# Patient Record
Sex: MALE | Race: WHITE | NOT HISPANIC OR LATINO
[De-identification: names, ages, dates, MRNs, and addresses within clinical notes are randomized per-mention and may not be internally consistent; named-entity substitution may affect disease eponyms.]

---

## 2017-01-04 ENCOUNTER — RECORDS - HEALTHEAST (OUTPATIENT)
Dept: ADMINISTRATIVE | Facility: OTHER | Age: 82
End: 2017-01-04

## 2017-01-05 ENCOUNTER — AMBULATORY - HEALTHEAST (OUTPATIENT)
Dept: LAB | Facility: CLINIC | Age: 82
End: 2017-01-05

## 2017-01-05 ENCOUNTER — COMMUNICATION - HEALTHEAST (OUTPATIENT)
Dept: INTERNAL MEDICINE | Facility: CLINIC | Age: 82
End: 2017-01-05

## 2017-01-05 DIAGNOSIS — Z79.01 LONG TERM (CURRENT) USE OF ANTICOAGULANTS: ICD-10-CM

## 2017-01-05 DIAGNOSIS — Z86.711 HISTORY OF PULMONARY EMBOLISM: ICD-10-CM

## 2017-01-17 ENCOUNTER — COMMUNICATION - HEALTHEAST (OUTPATIENT)
Dept: INTERNAL MEDICINE | Facility: CLINIC | Age: 82
End: 2017-01-17

## 2017-01-17 ENCOUNTER — AMBULATORY - HEALTHEAST (OUTPATIENT)
Dept: LAB | Facility: CLINIC | Age: 82
End: 2017-01-17

## 2017-01-17 DIAGNOSIS — Z79.01 LONG TERM (CURRENT) USE OF ANTICOAGULANTS: ICD-10-CM

## 2017-01-17 DIAGNOSIS — Z86.711 HISTORY OF PULMONARY EMBOLISM: ICD-10-CM

## 2017-01-27 ENCOUNTER — COMMUNICATION - HEALTHEAST (OUTPATIENT)
Dept: INTERNAL MEDICINE | Facility: CLINIC | Age: 82
End: 2017-01-27

## 2017-01-31 ENCOUNTER — COMMUNICATION - HEALTHEAST (OUTPATIENT)
Dept: INTERNAL MEDICINE | Facility: CLINIC | Age: 82
End: 2017-01-31

## 2017-02-02 ENCOUNTER — COMMUNICATION - HEALTHEAST (OUTPATIENT)
Dept: INTERNAL MEDICINE | Facility: CLINIC | Age: 82
End: 2017-02-02

## 2017-02-02 ENCOUNTER — AMBULATORY - HEALTHEAST (OUTPATIENT)
Dept: INTERNAL MEDICINE | Facility: CLINIC | Age: 82
End: 2017-02-02

## 2017-02-02 ENCOUNTER — AMBULATORY - HEALTHEAST (OUTPATIENT)
Dept: LAB | Facility: CLINIC | Age: 82
End: 2017-02-02

## 2017-02-02 DIAGNOSIS — Z79.01 LONG TERM (CURRENT) USE OF ANTICOAGULANTS: ICD-10-CM

## 2017-02-02 DIAGNOSIS — Z86.711 HISTORY OF PULMONARY EMBOLISM: ICD-10-CM

## 2017-02-06 ENCOUNTER — COMMUNICATION - HEALTHEAST (OUTPATIENT)
Dept: INTERNAL MEDICINE | Facility: CLINIC | Age: 82
End: 2017-02-06

## 2017-02-06 DIAGNOSIS — Z79.899 ON POTASSIUM WASTING DIURETIC THERAPY: ICD-10-CM

## 2017-02-22 ENCOUNTER — AMBULATORY - HEALTHEAST (OUTPATIENT)
Dept: LAB | Facility: CLINIC | Age: 82
End: 2017-02-22

## 2017-02-22 ENCOUNTER — COMMUNICATION - HEALTHEAST (OUTPATIENT)
Dept: INTERNAL MEDICINE | Facility: CLINIC | Age: 82
End: 2017-02-22

## 2017-02-22 DIAGNOSIS — Z79.01 LONG TERM (CURRENT) USE OF ANTICOAGULANTS: ICD-10-CM

## 2017-02-22 DIAGNOSIS — Z86.711 HISTORY OF PULMONARY EMBOLISM: ICD-10-CM

## 2017-03-02 ENCOUNTER — COMMUNICATION - HEALTHEAST (OUTPATIENT)
Dept: INTERNAL MEDICINE | Facility: CLINIC | Age: 82
End: 2017-03-02

## 2017-03-11 ENCOUNTER — COMMUNICATION - HEALTHEAST (OUTPATIENT)
Dept: INTERNAL MEDICINE | Facility: CLINIC | Age: 82
End: 2017-03-11

## 2017-03-27 ENCOUNTER — AMBULATORY - HEALTHEAST (OUTPATIENT)
Dept: LAB | Facility: CLINIC | Age: 82
End: 2017-03-27

## 2017-03-27 ENCOUNTER — COMMUNICATION - HEALTHEAST (OUTPATIENT)
Dept: INTERNAL MEDICINE | Facility: CLINIC | Age: 82
End: 2017-03-27

## 2017-03-27 DIAGNOSIS — Z86.711 HISTORY OF PULMONARY EMBOLISM: ICD-10-CM

## 2017-03-27 DIAGNOSIS — Z79.01 LONG TERM (CURRENT) USE OF ANTICOAGULANTS: ICD-10-CM

## 2017-04-10 ENCOUNTER — COMMUNICATION - HEALTHEAST (OUTPATIENT)
Dept: NURSING | Facility: CLINIC | Age: 82
End: 2017-04-10

## 2017-04-10 ENCOUNTER — COMMUNICATION - HEALTHEAST (OUTPATIENT)
Dept: INTERNAL MEDICINE | Facility: CLINIC | Age: 82
End: 2017-04-10

## 2017-04-10 ENCOUNTER — AMBULATORY - HEALTHEAST (OUTPATIENT)
Dept: LAB | Facility: CLINIC | Age: 82
End: 2017-04-10

## 2017-04-10 DIAGNOSIS — Z79.01 LONG TERM (CURRENT) USE OF ANTICOAGULANTS: ICD-10-CM

## 2017-04-10 DIAGNOSIS — Z86.711 HISTORY OF PULMONARY EMBOLISM: ICD-10-CM

## 2017-04-20 ENCOUNTER — COMMUNICATION - HEALTHEAST (OUTPATIENT)
Dept: INTERNAL MEDICINE | Facility: CLINIC | Age: 82
End: 2017-04-20

## 2017-04-20 ENCOUNTER — AMBULATORY - HEALTHEAST (OUTPATIENT)
Dept: LAB | Facility: CLINIC | Age: 82
End: 2017-04-20

## 2017-04-20 DIAGNOSIS — Z79.01 LONG TERM (CURRENT) USE OF ANTICOAGULANTS: ICD-10-CM

## 2017-04-20 DIAGNOSIS — Z86.711 HISTORY OF PULMONARY EMBOLISM: ICD-10-CM

## 2017-05-22 ENCOUNTER — COMMUNICATION - HEALTHEAST (OUTPATIENT)
Dept: INTERNAL MEDICINE | Facility: CLINIC | Age: 82
End: 2017-05-22

## 2017-05-24 ENCOUNTER — COMMUNICATION - HEALTHEAST (OUTPATIENT)
Dept: INTERNAL MEDICINE | Facility: CLINIC | Age: 82
End: 2017-05-24

## 2017-05-24 DIAGNOSIS — K21.9 GERD (GASTROESOPHAGEAL REFLUX DISEASE): ICD-10-CM

## 2017-05-24 DIAGNOSIS — I10 HTN (HYPERTENSION): ICD-10-CM

## 2017-06-05 ENCOUNTER — COMMUNICATION - HEALTHEAST (OUTPATIENT)
Dept: INTERNAL MEDICINE | Facility: CLINIC | Age: 82
End: 2017-06-05

## 2017-06-05 ENCOUNTER — AMBULATORY - HEALTHEAST (OUTPATIENT)
Dept: LAB | Facility: CLINIC | Age: 82
End: 2017-06-05

## 2017-06-05 DIAGNOSIS — Z79.01 LONG TERM (CURRENT) USE OF ANTICOAGULANTS: ICD-10-CM

## 2017-06-05 DIAGNOSIS — Z86.711 HISTORY OF PULMONARY EMBOLISM: ICD-10-CM

## 2017-06-14 ENCOUNTER — COMMUNICATION - HEALTHEAST (OUTPATIENT)
Dept: INTERNAL MEDICINE | Facility: CLINIC | Age: 82
End: 2017-06-14

## 2017-06-14 DIAGNOSIS — Z79.01 LONG TERM (CURRENT) USE OF ANTICOAGULANTS: ICD-10-CM

## 2017-06-22 ENCOUNTER — COMMUNICATION - HEALTHEAST (OUTPATIENT)
Dept: INTERNAL MEDICINE | Facility: CLINIC | Age: 82
End: 2017-06-22

## 2017-06-22 DIAGNOSIS — I10 HYPERTENSION: ICD-10-CM

## 2017-07-18 ENCOUNTER — COMMUNICATION - HEALTHEAST (OUTPATIENT)
Dept: FAMILY MEDICINE | Facility: CLINIC | Age: 82
End: 2017-07-18

## 2017-07-24 ENCOUNTER — COMMUNICATION - HEALTHEAST (OUTPATIENT)
Dept: INTERNAL MEDICINE | Facility: CLINIC | Age: 82
End: 2017-07-24

## 2017-07-24 ENCOUNTER — AMBULATORY - HEALTHEAST (OUTPATIENT)
Dept: LAB | Facility: CLINIC | Age: 82
End: 2017-07-24

## 2017-07-24 DIAGNOSIS — Z86.711 PERSONAL HISTORY OF PE (PULMONARY EMBOLISM): ICD-10-CM

## 2017-07-24 DIAGNOSIS — Z79.01 LONG TERM (CURRENT) USE OF ANTICOAGULANTS: ICD-10-CM

## 2017-07-24 DIAGNOSIS — Z86.711 HISTORY OF PULMONARY EMBOLISM: ICD-10-CM

## 2017-07-24 DIAGNOSIS — Z79.01 WARFARIN ANTICOAGULATION: ICD-10-CM

## 2017-07-25 ENCOUNTER — COMMUNICATION - HEALTHEAST (OUTPATIENT)
Dept: INTERNAL MEDICINE | Facility: CLINIC | Age: 82
End: 2017-07-25

## 2017-08-09 ENCOUNTER — COMMUNICATION - HEALTHEAST (OUTPATIENT)
Dept: INTERNAL MEDICINE | Facility: CLINIC | Age: 82
End: 2017-08-09

## 2017-08-09 DIAGNOSIS — Z79.899 ON POTASSIUM WASTING DIURETIC THERAPY: ICD-10-CM

## 2017-08-29 ENCOUNTER — COMMUNICATION - HEALTHEAST (OUTPATIENT)
Dept: INTERNAL MEDICINE | Facility: CLINIC | Age: 82
End: 2017-08-29

## 2017-09-16 ENCOUNTER — COMMUNICATION - HEALTHEAST (OUTPATIENT)
Dept: INTERNAL MEDICINE | Facility: CLINIC | Age: 82
End: 2017-09-16

## 2017-09-20 ENCOUNTER — COMMUNICATION - HEALTHEAST (OUTPATIENT)
Dept: INTERNAL MEDICINE | Facility: CLINIC | Age: 82
End: 2017-09-20

## 2017-09-20 ENCOUNTER — AMBULATORY - HEALTHEAST (OUTPATIENT)
Dept: LAB | Facility: CLINIC | Age: 82
End: 2017-09-20

## 2017-09-20 DIAGNOSIS — Z79.01 LONG TERM (CURRENT) USE OF ANTICOAGULANTS: ICD-10-CM

## 2017-09-20 DIAGNOSIS — Z79.01 WARFARIN ANTICOAGULATION: ICD-10-CM

## 2017-09-20 DIAGNOSIS — Z86.711 PERSONAL HISTORY OF PE (PULMONARY EMBOLISM): ICD-10-CM

## 2017-10-09 ENCOUNTER — COMMUNICATION - HEALTHEAST (OUTPATIENT)
Dept: INTERNAL MEDICINE | Facility: CLINIC | Age: 82
End: 2017-10-09

## 2017-10-09 DIAGNOSIS — I10 HYPERTENSION: ICD-10-CM

## 2017-10-16 ENCOUNTER — COMMUNICATION - HEALTHEAST (OUTPATIENT)
Dept: INTERNAL MEDICINE | Facility: CLINIC | Age: 82
End: 2017-10-16

## 2017-10-24 ENCOUNTER — AMBULATORY - HEALTHEAST (OUTPATIENT)
Dept: LAB | Facility: CLINIC | Age: 82
End: 2017-10-24

## 2017-10-24 ENCOUNTER — COMMUNICATION - HEALTHEAST (OUTPATIENT)
Dept: INTERNAL MEDICINE | Facility: CLINIC | Age: 82
End: 2017-10-24

## 2017-10-24 DIAGNOSIS — Z79.01 LONG TERM CURRENT USE OF ANTICOAGULANT THERAPY: ICD-10-CM

## 2017-10-24 DIAGNOSIS — Z79.01 WARFARIN ANTICOAGULATION: ICD-10-CM

## 2017-10-24 DIAGNOSIS — Z86.711 PERSONAL HISTORY OF PE (PULMONARY EMBOLISM): ICD-10-CM

## 2017-11-14 ENCOUNTER — COMMUNICATION - HEALTHEAST (OUTPATIENT)
Dept: NURSING | Facility: CLINIC | Age: 82
End: 2017-11-14

## 2017-11-20 ENCOUNTER — COMMUNICATION - HEALTHEAST (OUTPATIENT)
Dept: INTERNAL MEDICINE | Facility: CLINIC | Age: 82
End: 2017-11-20

## 2017-11-21 ENCOUNTER — COMMUNICATION - HEALTHEAST (OUTPATIENT)
Dept: INTERNAL MEDICINE | Facility: CLINIC | Age: 82
End: 2017-11-21

## 2017-11-21 DIAGNOSIS — Z79.899 ON POTASSIUM WASTING DIURETIC THERAPY: ICD-10-CM

## 2017-11-29 ENCOUNTER — AMBULATORY - HEALTHEAST (OUTPATIENT)
Dept: LAB | Facility: CLINIC | Age: 82
End: 2017-11-29

## 2017-11-29 ENCOUNTER — COMMUNICATION - HEALTHEAST (OUTPATIENT)
Dept: INTERNAL MEDICINE | Facility: CLINIC | Age: 82
End: 2017-11-29

## 2017-11-29 DIAGNOSIS — Z79.01 LONG TERM CURRENT USE OF ANTICOAGULANT THERAPY: ICD-10-CM

## 2017-11-29 DIAGNOSIS — Z86.711 PERSONAL HISTORY OF PE (PULMONARY EMBOLISM): ICD-10-CM

## 2017-11-29 DIAGNOSIS — Z79.01 WARFARIN ANTICOAGULATION: ICD-10-CM

## 2018-01-01 ENCOUNTER — COMMUNICATION - HEALTHEAST (OUTPATIENT)
Dept: INTERNAL MEDICINE | Facility: CLINIC | Age: 83
End: 2018-01-01

## 2018-01-01 ENCOUNTER — COMMUNICATION - HEALTHEAST (OUTPATIENT)
Dept: ANTICOAGULATION | Facility: CLINIC | Age: 83
End: 2018-01-01

## 2018-01-01 ENCOUNTER — COMMUNICATION - HEALTHEAST (OUTPATIENT)
Dept: GERIATRICS | Facility: CLINIC | Age: 83
End: 2018-01-01

## 2018-01-01 ENCOUNTER — RECORDS - HEALTHEAST (OUTPATIENT)
Dept: LAB | Facility: CLINIC | Age: 83
End: 2018-01-01

## 2018-01-01 ENCOUNTER — OFFICE VISIT - HEALTHEAST (OUTPATIENT)
Dept: GERIATRICS | Facility: CLINIC | Age: 83
End: 2018-01-01

## 2018-01-01 ENCOUNTER — AMBULATORY - HEALTHEAST (OUTPATIENT)
Dept: LAB | Facility: CLINIC | Age: 83
End: 2018-01-01

## 2018-01-01 ENCOUNTER — COMMUNICATION - HEALTHEAST (OUTPATIENT)
Dept: LAB | Facility: CLINIC | Age: 83
End: 2018-01-01

## 2018-01-01 ENCOUNTER — RECORDS - HEALTHEAST (OUTPATIENT)
Dept: ADMINISTRATIVE | Facility: OTHER | Age: 83
End: 2018-01-01

## 2018-01-01 ENCOUNTER — AMBULATORY - HEALTHEAST (OUTPATIENT)
Dept: GERIATRICS | Facility: CLINIC | Age: 83
End: 2018-01-01

## 2018-01-01 ENCOUNTER — PATIENT OUTREACH (OUTPATIENT)
Dept: CARE COORDINATION | Facility: CLINIC | Age: 83
End: 2018-01-01

## 2018-01-01 ENCOUNTER — COMMUNICATION - HEALTHEAST (OUTPATIENT)
Dept: SCHEDULING | Facility: CLINIC | Age: 83
End: 2018-01-01

## 2018-01-01 ENCOUNTER — OFFICE VISIT - HEALTHEAST (OUTPATIENT)
Dept: INTERNAL MEDICINE | Facility: CLINIC | Age: 83
End: 2018-01-01

## 2018-01-01 DIAGNOSIS — M62.81 MUSCLE WEAKNESS (GENERALIZED): ICD-10-CM

## 2018-01-01 DIAGNOSIS — I10 ESSENTIAL HYPERTENSION: ICD-10-CM

## 2018-01-01 DIAGNOSIS — G89.29 CHRONIC LOW BACK PAIN, UNSPECIFIED BACK PAIN LATERALITY, WITH SCIATICA PRESENCE UNSPECIFIED: ICD-10-CM

## 2018-01-01 DIAGNOSIS — Z79.01 WARFARIN ANTICOAGULATION: ICD-10-CM

## 2018-01-01 DIAGNOSIS — Z86.711 PERSONAL HISTORY OF PE (PULMONARY EMBOLISM): ICD-10-CM

## 2018-01-01 DIAGNOSIS — N17.9 AKI (ACUTE KIDNEY INJURY) (H): ICD-10-CM

## 2018-01-01 DIAGNOSIS — Z79.01 LONG TERM (CURRENT) USE OF ANTICOAGULANTS: ICD-10-CM

## 2018-01-01 DIAGNOSIS — Z86.711 HISTORY OF PULMONARY EMBOLUS (PE): ICD-10-CM

## 2018-01-01 DIAGNOSIS — L40.9 PSORIASIS: ICD-10-CM

## 2018-01-01 DIAGNOSIS — I25.10 ATHEROSCLEROSIS OF CORONARY ARTERY, ANGINA PRESENCE UNSPECIFIED, UNSPECIFIED VESSEL OR LESION TYPE, UNSPECIFIED WHETHER NATIVE OR TRANSPLANTED HEART: ICD-10-CM
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
## 2018-01-01 DIAGNOSIS — R60.0 BILATERAL LOWER EXTREMITY EDEMA: ICD-10-CM

## 2018-01-01 DIAGNOSIS — K59.00 CONSTIPATION: ICD-10-CM

## 2018-01-01 DIAGNOSIS — R53.83 FATIGUE, UNSPECIFIED TYPE: ICD-10-CM

## 2018-01-01 DIAGNOSIS — R39.9 URINARY TRACT INFECTION SYMPTOMS: ICD-10-CM

## 2018-01-01 DIAGNOSIS — L97.929 VENOUS STASIS ULCER OF LEFT LOWER EXTREMITY (H): ICD-10-CM

## 2018-01-01 DIAGNOSIS — M17.11 PRIMARY OSTEOARTHRITIS OF RIGHT KNEE: ICD-10-CM

## 2018-01-01 DIAGNOSIS — Z79.899 ENCOUNTER FOR LONG-TERM (CURRENT) USE OF MEDICATIONS: ICD-10-CM

## 2018-01-01 DIAGNOSIS — E87.6 HYPOKALEMIA: ICD-10-CM

## 2018-01-01 DIAGNOSIS — D64.9 ANEMIA: ICD-10-CM

## 2018-01-01 DIAGNOSIS — D35.00 ADRENAL ADENOMA, UNSPECIFIED LATERALITY: ICD-10-CM

## 2018-01-01 DIAGNOSIS — R26.2 CANNOT WALK: ICD-10-CM

## 2018-01-01 DIAGNOSIS — I83.029 VENOUS STASIS ULCER OF LEFT LOWER EXTREMITY (H): ICD-10-CM

## 2018-01-01 DIAGNOSIS — L97.829 NON-PRESSURE CHRONIC ULCER OF OTHER PART OF LEFT LOWER LEG WITH UNSPECIFIED SEVERITY (H): ICD-10-CM

## 2018-01-01 DIAGNOSIS — G89.4 CHRONIC PAIN SYNDROME: ICD-10-CM

## 2018-01-01 DIAGNOSIS — R41.3 MEMORY LOSS: ICD-10-CM
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
## 2018-01-01 DIAGNOSIS — L98.9 SKIN LESIONS: ICD-10-CM

## 2018-01-01 DIAGNOSIS — I89.0 LYMPHEDEMA OF RIGHT LOWER EXTREMITY: ICD-10-CM

## 2018-01-01 DIAGNOSIS — M54.5 CHRONIC LOW BACK PAIN, UNSPECIFIED BACK PAIN LATERALITY, WITH SCIATICA PRESENCE UNSPECIFIED: ICD-10-CM

## 2018-01-01 DIAGNOSIS — S06.0X9A CONCUSSION WITH LOSS OF CONSCIOUSNESS, INITIAL ENCOUNTER: ICD-10-CM

## 2018-01-01 DIAGNOSIS — G93.40 ENCEPHALOPATHY: ICD-10-CM

## 2018-01-01 DIAGNOSIS — E27.9 ADRENAL NODULE (H): ICD-10-CM

## 2018-01-01 LAB
ALBUMIN SERPL-MCNC: 3.7 G/DL (ref 3.5–5)
ALBUMIN SERPL-MCNC: 3.7 G/DL (ref 3.5–5)
ALBUMIN UR-MCNC: ABNORMAL MG/DL
ALP SERPL-CCNC: 35 U/L (ref 45–120)
ALP SERPL-CCNC: 54 U/L (ref 45–120)
ALT SERPL W P-5'-P-CCNC: 11 U/L (ref 0–45)
ALT SERPL W P-5'-P-CCNC: 21 U/L (ref 0–45)
ANION GAP SERPL CALCULATED.3IONS-SCNC: 12 MMOL/L (ref 5–18)
ANION GAP SERPL CALCULATED.3IONS-SCNC: 12 MMOL/L (ref 5–18)
ANION GAP SERPL CALCULATED.3IONS-SCNC: 9 MMOL/L (ref 5–18)
ANION GAP SERPL CALCULATED.3IONS-SCNC: 9 MMOL/L (ref 5–18)
APPEARANCE UR: ABNORMAL
AST SERPL W P-5'-P-CCNC: 11 U/L (ref 0–40)
AST SERPL W P-5'-P-CCNC: 17 U/L (ref 0–40)
BACTERIA #/AREA URNS HPF: ABNORMAL HPF
BACTERIA SPEC CULT: ABNORMAL
BILIRUB SERPL-MCNC: 0.6 MG/DL (ref 0–1)
BILIRUB SERPL-MCNC: 0.7 MG/DL (ref 0–1)
BILIRUB UR QL STRIP: NEGATIVE
BUN SERPL-MCNC: 20 MG/DL (ref 8–28)
BUN SERPL-MCNC: 20 MG/DL (ref 8–28)
BUN SERPL-MCNC: 24 MG/DL (ref 8–28)
BUN SERPL-MCNC: 29 MG/DL (ref 8–28)
CALCIUM SERPL-MCNC: 10.1 MG/DL (ref 8.5–10.5)
CALCIUM SERPL-MCNC: 9.4 MG/DL (ref 8.5–10.5)
CALCIUM SERPL-MCNC: 9.5 MG/DL (ref 8.5–10.5)
CALCIUM SERPL-MCNC: 9.6 MG/DL (ref 8.5–10.5)
CHLORIDE BLD-SCNC: 100 MMOL/L (ref 98–107)
CHLORIDE BLD-SCNC: 102 MMOL/L (ref 98–107)
CHLORIDE BLD-SCNC: 105 MMOL/L (ref 98–107)
CHLORIDE BLD-SCNC: 97 MMOL/L (ref 98–107)
CO2 SERPL-SCNC: 25 MMOL/L (ref 22–31)
CO2 SERPL-SCNC: 25 MMOL/L (ref 22–31)
CO2 SERPL-SCNC: 28 MMOL/L (ref 22–31)
CO2 SERPL-SCNC: 28 MMOL/L (ref 22–31)
COLOR UR AUTO: YELLOW
CREAT SERPL-MCNC: 0.8 MG/DL (ref 0.7–1.3)
CREAT SERPL-MCNC: 0.89 MG/DL (ref 0.7–1.3)
CREAT SERPL-MCNC: 1.18 MG/DL (ref 0.7–1.3)
CREAT SERPL-MCNC: 1.6 MG/DL (ref 0.7–1.3)
ERYTHROCYTE [DISTWIDTH] IN BLOOD BY AUTOMATED COUNT: 12.2 % (ref 11–14.5)
ERYTHROCYTE [DISTWIDTH] IN BLOOD BY AUTOMATED COUNT: 13.2 % (ref 11–14.5)
ERYTHROCYTE [DISTWIDTH] IN BLOOD BY AUTOMATED COUNT: 14.6 % (ref 11–14.5)
GFR SERPL CREATININE-BSD FRML MDRD: 41 ML/MIN/1.73M2
GFR SERPL CREATININE-BSD FRML MDRD: 59 ML/MIN/1.73M2
GFR SERPL CREATININE-BSD FRML MDRD: >60 ML/MIN/1.73M2
GFR SERPL CREATININE-BSD FRML MDRD: >60 ML/MIN/1.73M2
GLUCOSE BLD-MCNC: 102 MG/DL (ref 70–125)
GLUCOSE BLD-MCNC: 87 MG/DL (ref 70–125)
GLUCOSE BLD-MCNC: 91 MG/DL (ref 70–125)
GLUCOSE BLD-MCNC: 91 MG/DL (ref 70–125)
GLUCOSE UR STRIP-MCNC: NEGATIVE MG/DL
HCT VFR BLD AUTO: 31.8 % (ref 40–54)
HCT VFR BLD AUTO: 36.2 % (ref 40–54)
HCT VFR BLD AUTO: 37.3 % (ref 40–54)
HGB BLD-MCNC: 10.4 G/DL (ref 14–18)
HGB BLD-MCNC: 12.6 G/DL (ref 14–18)
HGB BLD-MCNC: 12.9 G/DL (ref 14–18)
HGB UR QL STRIP: ABNORMAL
INR PPP: 1.45 (ref 0.9–1.1)
INR PPP: 1.7 (ref 0.9–1.1)
INR PPP: 2.32 (ref 0.9–1.1)
INR PPP: 2.33 (ref 0.9–1.1)
INR PPP: 2.39 (ref 0.9–1.1)
INR PPP: 2.62 (ref 0.9–1.1)
INR PPP: 2.7 (ref 0.9–1.1)
INR PPP: 3.2 (ref 0.9–1.1)
INR PPP: 3.61 (ref 0.9–1.1)
INR PPP: 4.3 (ref 0.9–1.1)
INR PPP: 4.8 (ref 0.9–1.1)
INR PPP: 4.94 (ref 0.9–1.1)
INR PPP: 5.2 (ref 0.9–1.1)
KETONES UR STRIP-MCNC: NEGATIVE MG/DL
LEUKOCYTE ESTERASE UR QL STRIP: ABNORMAL
MCH RBC QN AUTO: 33.8 PG (ref 27–34)
MCH RBC QN AUTO: 35.1 PG (ref 27–34)
MCH RBC QN AUTO: 36.1 PG (ref 27–34)
MCHC RBC AUTO-ENTMCNC: 32.7 G/DL (ref 32–36)
MCHC RBC AUTO-ENTMCNC: 34.5 G/DL (ref 32–36)
MCHC RBC AUTO-ENTMCNC: 34.7 G/DL (ref 32–36)
MCV RBC AUTO: 101 FL (ref 80–100)
MCV RBC AUTO: 110 FL (ref 80–100)
MCV RBC AUTO: 97 FL (ref 80–100)
NITRATE UR QL: POSITIVE
PH UR STRIP: 8 [PH] (ref 4.5–8)
PLATELET # BLD AUTO: 249 THOU/UL (ref 140–440)
PLATELET # BLD AUTO: 249 THOU/UL (ref 140–440)
PLATELET # BLD AUTO: 293 THOU/UL (ref 140–440)
PMV BLD AUTO: 6.4 FL (ref 7–10)
PMV BLD AUTO: 6.5 FL (ref 7–10)
PMV BLD AUTO: 9.6 FL (ref 8.5–12.5)
POTASSIUM BLD-SCNC: 3.8 MMOL/L (ref 3.5–5)
POTASSIUM BLD-SCNC: 4.1 MMOL/L (ref 3.5–5)
POTASSIUM BLD-SCNC: 4.1 MMOL/L (ref 3.5–5)
POTASSIUM BLD-SCNC: 4.3 MMOL/L (ref 3.5–5)
PROT SERPL-MCNC: 6.3 G/DL (ref 6–8)
PROT SERPL-MCNC: 6.4 G/DL (ref 6–8)
RBC # BLD AUTO: 2.88 MILL/UL (ref 4.4–6.2)
RBC # BLD AUTO: 3.68 MILL/UL (ref 4.4–6.2)
RBC # BLD AUTO: 3.72 MILL/UL (ref 4.4–6.2)
RBC #/AREA URNS AUTO: ABNORMAL HPF
SODIUM SERPL-SCNC: 136 MMOL/L (ref 136–145)
SODIUM SERPL-SCNC: 137 MMOL/L (ref 136–145)
SODIUM SERPL-SCNC: 139 MMOL/L (ref 136–145)
SODIUM SERPL-SCNC: 140 MMOL/L (ref 136–145)
SP GR UR STRIP: 1.02 (ref 1–1.03)
SQUAMOUS #/AREA URNS AUTO: ABNORMAL LPF
UROBILINOGEN UR STRIP-ACNC: ABNORMAL
WBC #/AREA URNS AUTO: >100 HPF
WBC: 8.8 THOU/UL (ref 4–11)
WBC: 9.8 THOU/UL (ref 4–11)
WBC: 9.9 THOU/UL (ref 4–11)

## 2018-01-05 ENCOUNTER — COMMUNICATION - HEALTHEAST (OUTPATIENT)
Dept: INTERNAL MEDICINE | Facility: CLINIC | Age: 83
End: 2018-01-05

## 2018-02-15 ENCOUNTER — OFFICE VISIT - HEALTHEAST (OUTPATIENT)
Dept: INTERNAL MEDICINE | Facility: CLINIC | Age: 83
End: 2018-02-15

## 2018-02-15 ENCOUNTER — COMMUNICATION - HEALTHEAST (OUTPATIENT)
Dept: INTERNAL MEDICINE | Facility: CLINIC | Age: 83
End: 2018-02-15

## 2018-02-15 DIAGNOSIS — M54.5 CHRONIC MIDLINE LOW BACK PAIN, WITH SCIATICA PRESENCE UNSPECIFIED: ICD-10-CM

## 2018-02-15 DIAGNOSIS — Z79.01 WARFARIN ANTICOAGULATION: ICD-10-CM

## 2018-02-15 DIAGNOSIS — Z79.01 LONG TERM CURRENT USE OF ANTICOAGULANT THERAPY: ICD-10-CM

## 2018-02-15 DIAGNOSIS — G89.29 CHRONIC MIDLINE LOW BACK PAIN, WITH SCIATICA PRESENCE UNSPECIFIED: ICD-10-CM

## 2018-02-15 DIAGNOSIS — M79.10 MYALGIA: ICD-10-CM

## 2018-02-15 DIAGNOSIS — I89.0 LYMPHEDEMA OF RIGHT LOWER EXTREMITY: ICD-10-CM

## 2018-02-15 DIAGNOSIS — I10 ESSENTIAL HYPERTENSION WITH GOAL BLOOD PRESSURE LESS THAN 140/90: ICD-10-CM

## 2018-02-15 DIAGNOSIS — I10 ESSENTIAL HYPERTENSION: ICD-10-CM

## 2018-02-15 DIAGNOSIS — Z86.711 PERSONAL HISTORY OF PE (PULMONARY EMBOLISM): ICD-10-CM

## 2018-02-15 DIAGNOSIS — R60.1 GENERALIZED EDEMA: ICD-10-CM

## 2018-02-15 LAB
ALBUMIN SERPL-MCNC: 3.5 G/DL (ref 3.5–5)
ALP SERPL-CCNC: 48 U/L (ref 45–120)
ALT SERPL W P-5'-P-CCNC: 12 U/L (ref 0–45)
ANION GAP SERPL CALCULATED.3IONS-SCNC: 10 MMOL/L (ref 5–18)
AST SERPL W P-5'-P-CCNC: 14 U/L (ref 0–40)
BILIRUB SERPL-MCNC: 0.6 MG/DL (ref 0–1)
BUN SERPL-MCNC: 10 MG/DL (ref 8–28)
CALCIUM SERPL-MCNC: 9.1 MG/DL (ref 8.5–10.5)
CHLORIDE BLD-SCNC: 107 MMOL/L (ref 98–107)
CO2 SERPL-SCNC: 24 MMOL/L (ref 22–31)
CREAT SERPL-MCNC: 0.76 MG/DL (ref 0.7–1.3)
ERYTHROCYTE [DISTWIDTH] IN BLOOD BY AUTOMATED COUNT: 12 % (ref 11–14.5)
ERYTHROCYTE [SEDIMENTATION RATE] IN BLOOD BY WESTERGREN METHOD: 11 MM/HR (ref 0–15)
GFR SERPL CREATININE-BSD FRML MDRD: >60 ML/MIN/1.73M2
GLUCOSE BLD-MCNC: 93 MG/DL (ref 70–125)
HCT VFR BLD AUTO: 40.7 % (ref 40–54)
HGB BLD-MCNC: 13.7 G/DL (ref 14–18)
INR PPP: 1.8 (ref 0.9–1.1)
MCH RBC QN AUTO: 32.3 PG (ref 27–34)
MCHC RBC AUTO-ENTMCNC: 33.7 G/DL (ref 32–36)
MCV RBC AUTO: 96 FL (ref 80–100)
PLATELET # BLD AUTO: 201 THOU/UL (ref 140–440)
PMV BLD AUTO: 7.7 FL (ref 7–10)
POTASSIUM BLD-SCNC: 4.3 MMOL/L (ref 3.5–5)
PROT SERPL-MCNC: 6.2 G/DL (ref 6–8)
RBC # BLD AUTO: 4.24 MILL/UL (ref 4.4–6.2)
SODIUM SERPL-SCNC: 141 MMOL/L (ref 136–145)
TSH SERPL DL<=0.005 MIU/L-ACNC: 0.79 UIU/ML (ref 0.3–5)
URATE SERPL-MCNC: 5.7 MG/DL (ref 3–8)
VIT B12 SERPL-MCNC: 232 PG/ML (ref 213–816)
WBC: 7.2 THOU/UL (ref 4–11)

## 2018-02-16 ENCOUNTER — COMMUNICATION - HEALTHEAST (OUTPATIENT)
Dept: INTERNAL MEDICINE | Facility: CLINIC | Age: 83
End: 2018-02-16

## 2018-02-16 DIAGNOSIS — E55.9 VITAMIN D DEFICIENCY: ICD-10-CM

## 2018-02-16 DIAGNOSIS — D51.0 PERNICIOUS ANEMIA: ICD-10-CM

## 2018-02-16 LAB — 25(OH)D3 SERPL-MCNC: 8.8 NG/ML (ref 30–80)

## 2018-02-21 ENCOUNTER — AMBULATORY - HEALTHEAST (OUTPATIENT)
Dept: NURSING | Facility: CLINIC | Age: 83
End: 2018-02-21

## 2018-02-21 ENCOUNTER — COMMUNICATION - HEALTHEAST (OUTPATIENT)
Dept: INTERNAL MEDICINE | Facility: CLINIC | Age: 83
End: 2018-02-21

## 2018-02-21 DIAGNOSIS — Z79.899 ON POTASSIUM WASTING DIURETIC THERAPY: ICD-10-CM

## 2018-02-26 ENCOUNTER — COMMUNICATION - HEALTHEAST (OUTPATIENT)
Dept: INTERNAL MEDICINE | Facility: CLINIC | Age: 83
End: 2018-02-26

## 2018-02-26 DIAGNOSIS — I10 ESSENTIAL HYPERTENSION: ICD-10-CM

## 2018-03-01 ENCOUNTER — OFFICE VISIT - HEALTHEAST (OUTPATIENT)
Dept: INTERNAL MEDICINE | Facility: CLINIC | Age: 83
End: 2018-03-01

## 2018-03-01 DIAGNOSIS — R60.1 GENERALIZED EDEMA: ICD-10-CM

## 2018-03-01 DIAGNOSIS — M54.5 CHRONIC MIDLINE LOW BACK PAIN, WITH SCIATICA PRESENCE UNSPECIFIED: ICD-10-CM

## 2018-03-01 DIAGNOSIS — I10 ESSENTIAL HYPERTENSION: ICD-10-CM

## 2018-03-01 DIAGNOSIS — M25.561 ACUTE PAIN OF RIGHT KNEE: ICD-10-CM

## 2018-03-01 DIAGNOSIS — G89.29 CHRONIC MIDLINE LOW BACK PAIN, WITH SCIATICA PRESENCE UNSPECIFIED: ICD-10-CM

## 2018-03-13 ENCOUNTER — COMMUNICATION - HEALTHEAST (OUTPATIENT)
Dept: INTERNAL MEDICINE | Facility: CLINIC | Age: 83
End: 2018-03-13

## 2018-03-22 ENCOUNTER — OFFICE VISIT - HEALTHEAST (OUTPATIENT)
Dept: INTERNAL MEDICINE | Facility: CLINIC | Age: 83
End: 2018-03-22

## 2018-03-22 ENCOUNTER — COMMUNICATION - HEALTHEAST (OUTPATIENT)
Dept: INTERNAL MEDICINE | Facility: CLINIC | Age: 83
End: 2018-03-22

## 2018-03-22 DIAGNOSIS — Z79.01 LONG TERM CURRENT USE OF ANTICOAGULANT THERAPY: ICD-10-CM

## 2018-03-22 DIAGNOSIS — Z86.711 PERSONAL HISTORY OF PE (PULMONARY EMBOLISM): ICD-10-CM

## 2018-03-22 DIAGNOSIS — G89.29 CHRONIC MIDLINE LOW BACK PAIN, WITH SCIATICA PRESENCE UNSPECIFIED: ICD-10-CM

## 2018-03-22 DIAGNOSIS — M54.5 CHRONIC MIDLINE LOW BACK PAIN, WITH SCIATICA PRESENCE UNSPECIFIED: ICD-10-CM

## 2018-03-22 DIAGNOSIS — Z79.01 WARFARIN ANTICOAGULATION: ICD-10-CM

## 2018-03-22 DIAGNOSIS — R60.1 GENERALIZED EDEMA: ICD-10-CM

## 2018-03-22 DIAGNOSIS — I10 ESSENTIAL HYPERTENSION: ICD-10-CM

## 2018-03-22 LAB — INR PPP: 1.5 (ref 0.9–1.1)

## 2018-04-05 ENCOUNTER — COMMUNICATION - HEALTHEAST (OUTPATIENT)
Dept: ANTICOAGULATION | Facility: CLINIC | Age: 83
End: 2018-04-05

## 2018-04-05 ENCOUNTER — OFFICE VISIT - HEALTHEAST (OUTPATIENT)
Dept: INTERNAL MEDICINE | Facility: CLINIC | Age: 83
End: 2018-04-05

## 2018-04-05 DIAGNOSIS — I10 ESSENTIAL HYPERTENSION: ICD-10-CM

## 2018-04-05 DIAGNOSIS — Z79.01 LONG TERM CURRENT USE OF ANTICOAGULANT THERAPY: ICD-10-CM

## 2018-04-05 DIAGNOSIS — I89.0 LYMPHEDEMA OF RIGHT LOWER EXTREMITY: ICD-10-CM

## 2018-04-05 DIAGNOSIS — M54.5 CHRONIC MIDLINE LOW BACK PAIN, WITH SCIATICA PRESENCE UNSPECIFIED: ICD-10-CM

## 2018-04-05 DIAGNOSIS — Z86.711 PERSONAL HISTORY OF PE (PULMONARY EMBOLISM): ICD-10-CM

## 2018-04-05 DIAGNOSIS — Z79.01 WARFARIN ANTICOAGULATION: ICD-10-CM

## 2018-04-05 DIAGNOSIS — R60.1 GENERALIZED EDEMA: ICD-10-CM

## 2018-04-05 DIAGNOSIS — G89.29 CHRONIC MIDLINE LOW BACK PAIN, WITH SCIATICA PRESENCE UNSPECIFIED: ICD-10-CM

## 2018-04-05 LAB — INR PPP: 2.1 (ref 0.9–1.1)

## 2018-05-03 ENCOUNTER — COMMUNICATION - HEALTHEAST (OUTPATIENT)
Dept: ANTICOAGULATION | Facility: CLINIC | Age: 83
End: 2018-05-03

## 2018-05-03 ENCOUNTER — OFFICE VISIT - HEALTHEAST (OUTPATIENT)
Dept: INTERNAL MEDICINE | Facility: CLINIC | Age: 83
End: 2018-05-03

## 2018-05-03 DIAGNOSIS — Z79.01 LONG TERM CURRENT USE OF ANTICOAGULANT THERAPY: ICD-10-CM

## 2018-05-03 DIAGNOSIS — Z86.711 PERSONAL HISTORY OF PE (PULMONARY EMBOLISM): ICD-10-CM

## 2018-05-03 DIAGNOSIS — I10 ESSENTIAL HYPERTENSION: ICD-10-CM

## 2018-05-03 DIAGNOSIS — D51.0 PERNICIOUS ANEMIA: ICD-10-CM

## 2018-05-03 DIAGNOSIS — E55.9 VITAMIN D DEFICIENCY: ICD-10-CM

## 2018-05-03 DIAGNOSIS — Z79.01 WARFARIN ANTICOAGULATION: ICD-10-CM

## 2018-05-03 DIAGNOSIS — I89.0 LYMPHEDEMA OF RIGHT LOWER EXTREMITY: ICD-10-CM

## 2018-05-03 LAB
ANION GAP SERPL CALCULATED.3IONS-SCNC: 14 MMOL/L (ref 5–18)
BUN SERPL-MCNC: 41 MG/DL (ref 8–28)
CALCIUM SERPL-MCNC: 9.8 MG/DL (ref 8.5–10.5)
CHLORIDE BLD-SCNC: 99 MMOL/L (ref 98–107)
CO2 SERPL-SCNC: 28 MMOL/L (ref 22–31)
CREAT SERPL-MCNC: 1.19 MG/DL (ref 0.7–1.3)
ERYTHROCYTE [DISTWIDTH] IN BLOOD BY AUTOMATED COUNT: 11.5 % (ref 11–14.5)
GFR SERPL CREATININE-BSD FRML MDRD: 58 ML/MIN/1.73M2
GLUCOSE BLD-MCNC: 93 MG/DL (ref 70–125)
HCT VFR BLD AUTO: 44.5 % (ref 40–54)
HGB BLD-MCNC: 15.3 G/DL (ref 14–18)
INR PPP: 3 (ref 0.9–1.1)
MCH RBC QN AUTO: 32.8 PG (ref 27–34)
MCHC RBC AUTO-ENTMCNC: 34.4 G/DL (ref 32–36)
MCV RBC AUTO: 95 FL (ref 80–100)
PLATELET # BLD AUTO: 221 THOU/UL (ref 140–440)
PMV BLD AUTO: 7.2 FL (ref 7–10)
POTASSIUM BLD-SCNC: 4.5 MMOL/L (ref 3.5–5)
RBC # BLD AUTO: 4.66 MILL/UL (ref 4.4–6.2)
SODIUM SERPL-SCNC: 141 MMOL/L (ref 136–145)
WBC: 8.6 THOU/UL (ref 4–11)

## 2018-05-04 ENCOUNTER — COMMUNICATION - HEALTHEAST (OUTPATIENT)
Dept: INTERNAL MEDICINE | Facility: CLINIC | Age: 83
End: 2018-05-04

## 2018-05-04 LAB — 25(OH)D3 SERPL-MCNC: 45.8 NG/ML (ref 30–80)

## 2018-05-07 ENCOUNTER — COMMUNICATION - HEALTHEAST (OUTPATIENT)
Dept: INTERNAL MEDICINE | Facility: CLINIC | Age: 83
End: 2018-05-07

## 2018-05-17 ENCOUNTER — COMMUNICATION - HEALTHEAST (OUTPATIENT)
Dept: INTERNAL MEDICINE | Facility: CLINIC | Age: 83
End: 2018-05-17

## 2018-05-24 ENCOUNTER — COMMUNICATION - HEALTHEAST (OUTPATIENT)
Dept: INTERNAL MEDICINE | Facility: CLINIC | Age: 83
End: 2018-05-24

## 2018-05-25 ENCOUNTER — COMMUNICATION - HEALTHEAST (OUTPATIENT)
Dept: ANTICOAGULATION | Facility: CLINIC | Age: 83
End: 2018-05-25

## 2018-06-12 ENCOUNTER — COMMUNICATION - HEALTHEAST (OUTPATIENT)
Dept: INTERNAL MEDICINE | Facility: CLINIC | Age: 83
End: 2018-06-12

## 2018-06-19 ENCOUNTER — COMMUNICATION - HEALTHEAST (OUTPATIENT)
Dept: ANTICOAGULATION | Facility: CLINIC | Age: 83
End: 2018-06-19

## 2018-06-19 ENCOUNTER — OFFICE VISIT - HEALTHEAST (OUTPATIENT)
Dept: INTERNAL MEDICINE | Facility: CLINIC | Age: 83
End: 2018-06-19

## 2018-06-19 DIAGNOSIS — Z86.711 PERSONAL HISTORY OF PE (PULMONARY EMBOLISM): ICD-10-CM

## 2018-06-19 DIAGNOSIS — M17.11 PRIMARY OSTEOARTHRITIS OF RIGHT KNEE: ICD-10-CM

## 2018-06-19 DIAGNOSIS — R53.83 FATIGUE, UNSPECIFIED TYPE: ICD-10-CM

## 2018-06-19 DIAGNOSIS — H61.23 CERUMEN DEBRIS ON TYMPANIC MEMBRANE OF BOTH EARS: ICD-10-CM

## 2018-06-19 DIAGNOSIS — I89.0 LYMPHEDEMA OF RIGHT LOWER EXTREMITY: ICD-10-CM

## 2018-06-19 DIAGNOSIS — I10 ESSENTIAL HYPERTENSION: ICD-10-CM

## 2018-06-19 DIAGNOSIS — Z79.01 LONG TERM CURRENT USE OF ANTICOAGULANT THERAPY: ICD-10-CM

## 2018-06-19 DIAGNOSIS — Z79.01 WARFARIN ANTICOAGULATION: ICD-10-CM

## 2018-06-19 DIAGNOSIS — H11.31 SUBCONJUNCTIVAL HEMORRHAGE OF RIGHT EYE: ICD-10-CM

## 2018-06-19 LAB — INR PPP: 1.5 (ref 0.9–1.1)

## 2018-06-27 ENCOUNTER — RECORDS - HEALTHEAST (OUTPATIENT)
Dept: ADMINISTRATIVE | Facility: OTHER | Age: 83
End: 2018-06-27

## 2018-06-27 ENCOUNTER — COMMUNICATION - HEALTHEAST (OUTPATIENT)
Dept: INTERNAL MEDICINE | Facility: CLINIC | Age: 83
End: 2018-06-27

## 2018-06-27 ENCOUNTER — OFFICE VISIT - HEALTHEAST (OUTPATIENT)
Dept: INTERNAL MEDICINE | Facility: CLINIC | Age: 83
End: 2018-06-27

## 2018-06-27 DIAGNOSIS — L40.9 PSORIASIS: ICD-10-CM

## 2018-06-27 DIAGNOSIS — H61.21 CERUMEN DEBRIS ON TYMPANIC MEMBRANE OF RIGHT EAR: ICD-10-CM

## 2018-06-28 ENCOUNTER — COMMUNICATION - HEALTHEAST (OUTPATIENT)
Dept: ANTICOAGULATION | Facility: CLINIC | Age: 83
End: 2018-06-28

## 2018-06-28 DIAGNOSIS — Z79.01 WARFARIN ANTICOAGULATION: ICD-10-CM

## 2018-06-28 DIAGNOSIS — Z86.711 PERSONAL HISTORY OF PE (PULMONARY EMBOLISM): ICD-10-CM

## 2018-07-03 ENCOUNTER — COMMUNICATION - HEALTHEAST (OUTPATIENT)
Dept: ANTICOAGULATION | Facility: CLINIC | Age: 83
End: 2018-07-03

## 2018-07-03 ENCOUNTER — COMMUNICATION - HEALTHEAST (OUTPATIENT)
Dept: INTERNAL MEDICINE | Facility: CLINIC | Age: 83
End: 2018-07-03

## 2018-07-17 ENCOUNTER — COMMUNICATION - HEALTHEAST (OUTPATIENT)
Dept: ANTICOAGULATION | Facility: CLINIC | Age: 83
End: 2018-07-17

## 2018-07-17 ENCOUNTER — AMBULATORY - HEALTHEAST (OUTPATIENT)
Dept: LAB | Facility: CLINIC | Age: 83
End: 2018-07-17

## 2018-07-17 DIAGNOSIS — Z79.01 WARFARIN ANTICOAGULATION: ICD-10-CM

## 2018-07-17 DIAGNOSIS — Z86.711 PERSONAL HISTORY OF PE (PULMONARY EMBOLISM): ICD-10-CM

## 2018-07-17 LAB — INR PPP: 3.4 (ref 0.9–1.1)

## 2018-07-18 ENCOUNTER — COMMUNICATION - HEALTHEAST (OUTPATIENT)
Dept: INTERNAL MEDICINE | Facility: CLINIC | Age: 83
End: 2018-07-18

## 2018-07-18 DIAGNOSIS — R53.83 FATIGUE, UNSPECIFIED TYPE: ICD-10-CM

## 2018-12-26 NOTE — PROGRESS NOTES
Clinic Care Coordination Contact    Situation: Patient chart reviewed by care coordinator.    Background: Routed chart for post TCU call. From chart review:  [facility]     NAME:  Axel Gonzales             :  1932  MRN: [id number]  CODE STATUS:  DNR     VISIT TYPE: DISCHARGE SUMMARY  FACILYTY: [facility] [[id number]]                     PRIMARY CARE PROVIDER: Danis Kenny MD     DISCHARGE DIAGNOSIS:       1. Fall, subsequent encounter    2. Bilateral lower extremity edema    3. Muscle weakness (generalized)    4. Atherosclerosis of coronary artery, angina presence unspecified, unspecified vessel or lesion type, unspecified whether native or transplanted heart    5. Essential hypertension    6. Warfarin anticoagulation    7. Chronic low back pain, unspecified back pain laterality, with sciatica presence unspecified          HISTORY OF PRESENT ILLNESS: Axel Gonzales is a 85 y.o. male  with hearing loss, h/o PE on coumadin,  History of renal artery stenosis, Bilateral adrenal adenomas, MEGHANA not on CPAP, chronic lethargy on adderall xr, psoriasis presented to ED due to a fall.       Hospital Course:   -Fall in bathroom: suspect mechanical. Head contusion on coumadin with CT head x2 negative for ICH.  CT C/T/L spine no acute fractures.  See official report for details. Telemetry no dysrhythmias.   -Acute encephalopathy, unspecified with probable chronic cognitive impairment/memory loss: CT head neg x2.  VBG no hypercarbia or hypoxia.  TSH 0.38, B12 1464. UA neg for infection.  DEMOND on admission and received 4mg IM Morphine so suspect some of the confusion was related to this and delayed clearance of opiate.   However, per spouse had confusion a few days preceding admission which was not his baseline.  EtOH <10.  Initial leukocytosis 15K and normalized quickly.  CXR single view suggested Nodular opacity right perihilar region. Repeat 2V CXR negative.  Procalcitonin 0.06.   Afebrile.  BC's x2 negative.  Per Neurology will not perform additional w/u such as MRI at this time to exclude CVA. Although I'm concerned with his slight dysarthria and some word finding difficulties acute stroke is possible.  But as I explained to family he's already on Coumadin, and was started on Zocor 20mg qhs due to his LDL of 199, but otherwise management would not be different otherwise.  Also, he cannot lie down flat, and has slept in a recliner for years, and they agreed he would not tolerate an MRI.  Appears near baseline at discharge.  PT/OT at TCU.   -Possible aspiration pneumonitis: speech therapy consulted and bedside swallow evaluation negative for signs/symptoms of aspiration.  Repeat CXR with PA/Lateral negative for any acute infiltrate.   BC's negative.  No hypoxia.  Normal procalcitonin.  No antibiotics continued at discharge.  -H/O PE:  continue warfarin.  Subtherapeutic INR at discharge so dose increased to 3mg daily.  Normally on 2.5mg daily.  Repeat INR on 11/26 with TCU physician to adjust dose accordingly.  -DEMOND: resolved  -HTN:Continue Lisinopril as prescribed and adjust dose accordingly. Hold HCTZ.  Monitor closely at TCU.  -Chronic lethargy: resumed adderall xr after d/w neurology as patient has been on it chronically for fatigue and lethargy per his PCP.  However, decreased dose to 10mg daily.  Ultimately, I would recommend discontinuation of this agent.  -MEGHANA: failed cpap in the past. No hypercarbia on admission. Untreated MEGHANA likely contributor to his chronic Fatigue  -Psoriasis: resumed MTX at discharge. Cont Folic acid  -Chronic BLE edema: on warfarin chronic with therapeutic INR on admission so doubt acute DVT.  BNP 52 so unlikely acute decompensated heart failure.  Lasix at decreased dose of 20mg daily. Lymphedema wraps should continue daily at TCU.  -Chronic OA: has been on chronic prednisone 10mg daily per review of PCP note in 8/2018.   -The patient had a younger catheter  placed in the ED on admission.  The patient is to have the younger removed on 11/26 at the TCU and a voiding trial is to be performed per TCU protocol.  He should not have younger continued long-term.  Patient was transferred to TCU for continued rehabilitation.     SKILLED NURSING FACILITY COURSE:  During this TCU stay, patient completed all anticipated goals of therapy.     Assessment: patient discharged to assisted living with home care.      Plan/Recommendations: CC does not call people who live in [place]. No further action by CC.      Loretta Stephenson,   [facility]  [email or web address]  [phone]

## 2019-01-01 ENCOUNTER — COMMUNICATION - HEALTHEAST (OUTPATIENT)
Dept: INTERNAL MEDICINE | Facility: CLINIC | Age: 84
End: 2019-01-01

## 2019-01-01 ENCOUNTER — RECORDS - HEALTHEAST (OUTPATIENT)
Dept: LAB | Facility: CLINIC | Age: 84
End: 2019-01-01

## 2019-01-01 ENCOUNTER — AMBULATORY - HEALTHEAST (OUTPATIENT)
Dept: LAB | Facility: CLINIC | Age: 84
End: 2019-01-01

## 2019-01-01 ENCOUNTER — OFFICE VISIT - HEALTHEAST (OUTPATIENT)
Dept: INTERNAL MEDICINE | Facility: CLINIC | Age: 84
End: 2019-01-01

## 2019-01-01 ENCOUNTER — HOME CARE/HOSPICE - HEALTHEAST (OUTPATIENT)
Dept: HOSPICE | Facility: HOSPICE | Age: 84
End: 2019-01-01

## 2019-01-01 ENCOUNTER — COMMUNICATION - HEALTHEAST (OUTPATIENT)
Dept: ANTICOAGULATION | Facility: CLINIC | Age: 84
End: 2019-01-01

## 2019-01-01 ENCOUNTER — RECORDS - HEALTHEAST (OUTPATIENT)
Dept: ANTICOAGULATION | Facility: CLINIC | Age: 84
End: 2019-01-01

## 2019-01-01 ENCOUNTER — COMMUNICATION - HEALTHEAST (OUTPATIENT)
Dept: LAB | Facility: CLINIC | Age: 84
End: 2019-01-01

## 2019-01-01 DIAGNOSIS — Z86.711 PERSONAL HISTORY OF PE (PULMONARY EMBOLISM): ICD-10-CM

## 2019-01-01 DIAGNOSIS — I89.0 LYMPHEDEMA OF RIGHT LOWER EXTREMITY: ICD-10-CM

## 2019-01-01 DIAGNOSIS — Z79.01 LONG TERM (CURRENT) USE OF ANTICOAGULANTS: ICD-10-CM

## 2019-01-01 DIAGNOSIS — G89.29 CHRONIC LOW BACK PAIN, UNSPECIFIED BACK PAIN LATERALITY, WITH SCIATICA PRESENCE UNSPECIFIED: ICD-10-CM

## 2019-01-01 DIAGNOSIS — L40.9 PSORIASIS: ICD-10-CM

## 2019-01-01 DIAGNOSIS — Z79.01 WARFARIN ANTICOAGULATION: ICD-10-CM

## 2019-01-01 DIAGNOSIS — R53.83 FATIGUE, UNSPECIFIED TYPE: ICD-10-CM

## 2019-01-01 DIAGNOSIS — M62.81 GENERALIZED MUSCLE WEAKNESS: ICD-10-CM

## 2019-01-01 DIAGNOSIS — D64.9 ANEMIA, UNSPECIFIED: ICD-10-CM

## 2019-01-01 DIAGNOSIS — E27.9 ADRENAL NODULE (H): ICD-10-CM

## 2019-01-01 DIAGNOSIS — M54.5 CHRONIC LOW BACK PAIN, UNSPECIFIED BACK PAIN LATERALITY, WITH SCIATICA PRESENCE UNSPECIFIED: ICD-10-CM

## 2019-01-01 DIAGNOSIS — R73.01 IMPAIRED FASTING GLUCOSE: ICD-10-CM

## 2019-01-01 DIAGNOSIS — L40.50 PSORIATIC ARTHRITIS (H): ICD-10-CM

## 2019-01-01 DIAGNOSIS — I10 ESSENTIAL HYPERTENSION: ICD-10-CM

## 2019-01-01 DIAGNOSIS — K21.9 GERD (GASTROESOPHAGEAL REFLUX DISEASE): ICD-10-CM

## 2019-01-01 DIAGNOSIS — D51.0 PERNICIOUS ANEMIA: ICD-10-CM

## 2019-01-01 DIAGNOSIS — L97.929 VENOUS STASIS ULCER OF LEFT LOWER EXTREMITY (H): ICD-10-CM

## 2019-01-01 DIAGNOSIS — I83.029 VENOUS STASIS ULCER OF LEFT LOWER EXTREMITY (H): ICD-10-CM

## 2019-01-01 LAB
ALBUMIN SERPL-MCNC: 3.3 G/DL (ref 3.5–5)
ALBUMIN SERPL-MCNC: 3.4 G/DL (ref 3.5–5)
ALBUMIN SERPL-MCNC: 3.5 G/DL (ref 3.5–5)
ALP SERPL-CCNC: 51 U/L (ref 45–120)
ALP SERPL-CCNC: 52 U/L (ref 45–120)
ALP SERPL-CCNC: 55 U/L (ref 45–120)
ALT SERPL W P-5'-P-CCNC: 13 U/L (ref 0–45)
ALT SERPL W P-5'-P-CCNC: 15 U/L (ref 0–45)
ALT SERPL W P-5'-P-CCNC: 16 U/L (ref 0–45)
ANION GAP SERPL CALCULATED.3IONS-SCNC: 14 MMOL/L (ref 5–18)
ANION GAP SERPL CALCULATED.3IONS-SCNC: 15 MMOL/L (ref 5–18)
ANION GAP SERPL CALCULATED.3IONS-SCNC: 17 MMOL/L (ref 5–18)
AST SERPL W P-5'-P-CCNC: 11 U/L (ref 0–40)
AST SERPL W P-5'-P-CCNC: 14 U/L (ref 0–40)
AST SERPL W P-5'-P-CCNC: 15 U/L (ref 0–40)
BILIRUB SERPL-MCNC: 0.4 MG/DL (ref 0–1)
BILIRUB SERPL-MCNC: 0.4 MG/DL (ref 0–1)
BILIRUB SERPL-MCNC: 0.5 MG/DL (ref 0–1)
BUN SERPL-MCNC: 11 MG/DL (ref 8–28)
BUN SERPL-MCNC: 17 MG/DL (ref 8–28)
BUN SERPL-MCNC: 43 MG/DL (ref 8–28)
CALCIUM SERPL-MCNC: 10.3 MG/DL (ref 8.5–10.5)
CALCIUM SERPL-MCNC: 10.4 MG/DL (ref 8.5–10.5)
CALCIUM SERPL-MCNC: 9.2 MG/DL (ref 8.5–10.5)
CHLORIDE BLD-SCNC: 101 MMOL/L (ref 98–107)
CHLORIDE BLD-SCNC: 102 MMOL/L (ref 98–107)
CHLORIDE BLD-SCNC: 103 MMOL/L (ref 98–107)
CO2 SERPL-SCNC: 22 MMOL/L (ref 22–31)
CO2 SERPL-SCNC: 25 MMOL/L (ref 22–31)
CO2 SERPL-SCNC: 29 MMOL/L (ref 22–31)
CREAT SERPL-MCNC: 0.87 MG/DL (ref 0.7–1.3)
CREAT SERPL-MCNC: 0.99 MG/DL (ref 0.7–1.3)
CREAT SERPL-MCNC: 2.2 MG/DL (ref 0.7–1.3)
ERYTHROCYTE [DISTWIDTH] IN BLOOD BY AUTOMATED COUNT: 11.9 % (ref 11–14.5)
ERYTHROCYTE [DISTWIDTH] IN BLOOD BY AUTOMATED COUNT: 12 % (ref 11–14.5)
GFR SERPL CREATININE-BSD FRML MDRD: 29 ML/MIN/1.73M2
GFR SERPL CREATININE-BSD FRML MDRD: >60 ML/MIN/1.73M2
GFR SERPL CREATININE-BSD FRML MDRD: >60 ML/MIN/1.73M2
GLUCOSE BLD-MCNC: 106 MG/DL (ref 70–125)
GLUCOSE BLD-MCNC: 110 MG/DL (ref 70–125)
GLUCOSE BLD-MCNC: 96 MG/DL (ref 70–125)
HBA1C MFR BLD: 6 % (ref 3.5–6)
HCT VFR BLD AUTO: 33.1 % (ref 40–54)
HCT VFR BLD AUTO: 42.2 % (ref 40–54)
HGB BLD-MCNC: 11.2 G/DL (ref 14–18)
HGB BLD-MCNC: 14.3 G/DL (ref 14–18)
INR PPP: 1.46 (ref 0.9–1.1)
INR PPP: 1.46 (ref 0.9–1.1)
INR PPP: 1.81 (ref 0.9–1.1)
INR PPP: 1.9 (ref 0.9–1.1)
INR PPP: 1.9 (ref 0.9–1.1)
INR PPP: 2.3 (ref 0.9–1.1)
INR PPP: 2.33 (ref 0.9–1.1)
INR PPP: 2.35 (ref 0.9–1.1)
INR PPP: 2.64 (ref 0.9–1.1)
INR PPP: 2.65 (ref 0.9–1.1)
INR PPP: 2.91 (ref 0.9–1.1)
INR PPP: 3.06 (ref 0.9–1.1)
INR PPP: 3.24 (ref 0.9–1.1)
INR PPP: 3.69 (ref 0.9–1.1)
INR PPP: 3.7 (ref 0.9–1.1)
INR PPP: 4.7 (ref 0.9–1.1)
INR PPP: 7.02 (ref 0.9–1.1)
MCH RBC QN AUTO: 32.6 PG (ref 27–34)
MCH RBC QN AUTO: 34.6 PG (ref 27–34)
MCHC RBC AUTO-ENTMCNC: 33.8 G/DL (ref 32–36)
MCHC RBC AUTO-ENTMCNC: 33.9 G/DL (ref 32–36)
MCV RBC AUTO: 103 FL (ref 80–100)
MCV RBC AUTO: 96 FL (ref 80–100)
PLATELET # BLD AUTO: 214 THOU/UL (ref 140–440)
PLATELET # BLD AUTO: 346 THOU/UL (ref 140–440)
PMV BLD AUTO: 6.9 FL (ref 7–10)
PMV BLD AUTO: 7 FL (ref 7–10)
POTASSIUM BLD-SCNC: 3.8 MMOL/L (ref 3.5–5)
POTASSIUM BLD-SCNC: 4 MMOL/L (ref 3.5–5)
POTASSIUM BLD-SCNC: 4.5 MMOL/L (ref 3.5–5)
PROT SERPL-MCNC: 6.1 G/DL (ref 6–8)
PROT SERPL-MCNC: 6.4 G/DL (ref 6–8)
PROT SERPL-MCNC: 6.4 G/DL (ref 6–8)
RBC # BLD AUTO: 3.23 MILL/UL (ref 4.4–6.2)
RBC # BLD AUTO: 4.39 MILL/UL (ref 4.4–6.2)
SODIUM SERPL-SCNC: 141 MMOL/L (ref 136–145)
SODIUM SERPL-SCNC: 142 MMOL/L (ref 136–145)
SODIUM SERPL-SCNC: 145 MMOL/L (ref 136–145)
WBC: 9 THOU/UL (ref 4–11)
WBC: 9.1 THOU/UL (ref 4–11)

## 2019-01-01 RX ORDER — SPIRONOLACTONE 50 MG/1
50 TABLET, FILM COATED ORAL EVERY MORNING
Qty: 30 TABLET | Refills: 11 | Status: SHIPPED | OUTPATIENT
Start: 2019-01-01 | End: 2020-06-17

## 2019-01-01 RX ORDER — ZINC OXIDE
OINTMENT (GRAM) TOPICAL DAILY PRN
Qty: 56.7 G | Refills: 12 | Status: SHIPPED | OUTPATIENT
Start: 2019-01-01

## 2019-01-01 RX ORDER — DEXTROAMPHETAMINE SACCHARATE, AMPHETAMINE ASPARTATE MONOHYDRATE, DEXTROAMPHETAMINE SULFATE AND AMPHETAMINE SULFATE 3.75; 3.75; 3.75; 3.75 MG/1; MG/1; MG/1; MG/1
CAPSULE, EXTENDED RELEASE ORAL
Qty: 30 CAPSULE | Refills: 0 | Status: SHIPPED | OUTPATIENT
Start: 2019-01-01

## 2019-01-01 ASSESSMENT — MIFFLIN-ST. JEOR
SCORE: 1643.55
SCORE: 1680.97
SCORE: 1714.99

## 2019-07-24 ENCOUNTER — COMMUNICATION - HEALTHEAST (OUTPATIENT)
Dept: ANTICOAGULATION | Facility: CLINIC | Age: 84
End: 2019-07-24

## 2019-07-24 DIAGNOSIS — Z79.01 LONG TERM (CURRENT) USE OF ANTICOAGULANTS: ICD-10-CM

## 2019-11-18 ENCOUNTER — HOME CARE/HOSPICE - HEALTHEAST (OUTPATIENT)
Dept: HOSPICE | Facility: HOSPICE | Age: 84
End: 2019-11-18

## 2021-05-24 ENCOUNTER — RECORDS - HEALTHEAST (OUTPATIENT)
Dept: ADMINISTRATIVE | Facility: CLINIC | Age: 86
End: 2021-05-24

## 2021-05-25 ENCOUNTER — RECORDS - HEALTHEAST (OUTPATIENT)
Dept: ADMINISTRATIVE | Facility: CLINIC | Age: 86
End: 2021-05-25

## 2021-05-26 ENCOUNTER — RECORDS - HEALTHEAST (OUTPATIENT)
Dept: ADMINISTRATIVE | Facility: CLINIC | Age: 86
End: 2021-05-26

## 2021-05-27 ENCOUNTER — RECORDS - HEALTHEAST (OUTPATIENT)
Dept: ADMINISTRATIVE | Facility: CLINIC | Age: 86
End: 2021-05-27

## 2021-05-27 NOTE — TELEPHONE ENCOUNTER
Orders being requested: Power wheelchair  Reason service is needed/diagnosis: limited mobility  When are orders needed by: as soon as possible   Where to send Orders: Fax: [phone]  Okay to leave detailed message?  No [phone]    Caller stated that they have not received the order or the face to face note for the power wheelchair from 3/13  Caller stated to fax the order to the number above with refernce number [phone] and Attn: CDD.

## 2021-05-27 NOTE — TELEPHONE ENCOUNTER
Name of form/paperwork: Other:  Delivery Document from [facility]  Date form received by clinic:  4/3/19  When is the form needed by? No time frame per caller but they are not able to send paperwork to insurance  Patient Notified form requests are processed in 3-5 business days: Yes  (If patient needs for sooner, please note that in this message)  Okay to leave a detailed message: No [phone]  Reference # [id number]      Caller stated she would like a call back to confirm that the form has been received. Caller stated they faxed the form yesterday to [phone].

## 2021-05-27 NOTE — TELEPHONE ENCOUNTER
ANTICOAGULATION  MANAGEMENT- Home Care/Care Facility Result    Assessment     Today's INR result of 2.9 is Therapeutic (goal INR of 2.0-3.0)        Warfarin taken as previously instructed    No new diet changes affecting INR    No new medication/supplements affecting INR    Continues to tolerate warfarin with no reported s/s of bleeding or thromboembolism     Previous INR was Therapeutic    Plan:     Spoke with [place] pharmacy and left mesage [facility] nurses discussed INR result and instructed:     Warfarin Dosing Instructions: Continue current warfarin dose 2.5 mg daily on mon/wed/fri; and 5 mg daily rest of week  (0 % change)    Next INR to be drawn: two weeks     ?   Angel Holly RN    Subjective/Objective:      Axel Gonzales, a 86 y.o. male is established on warfarin.     Home care/care facility RN's report of Axel INR, recent warfarin dosing, diet changes, medication changes, and symptoms is documented below.    Additional findings: none    Anticoagulation Episode Summary     Current INR goal:   2.0-3.0   TTR:   59.7 % (3.3 y)   Next INR check:   4/24/2019   INR from last check:   2.91 (4/10/2019)   Weekly max warfarin dose:      Target end date:      INR check location:      Preferred lab:      Send INR reminders to:   ANTICOAGULATION POOL A (WBY,WBE,MID,RSC)    Indications    Long term (current) use of anticoagulants [Z79.01]  Pulmonary emboli (H) (Resolved) [I26.99]           Comments:            Anticoagulation Care Providers     Provider Role Specialty Phone number    Danis Kenny MD  Internal Medicine [phone]

## 2021-05-27 NOTE — TELEPHONE ENCOUNTER
Orders being requested: Extend Occupational Therapy: 3 times a week for 1 week  Reason service is needed/diagnosis: lymphedema management  When are orders needed by: ASAP  Where to send Orders: Phone:  [phone]  Okay to leave detailed message?  Yes

## 2021-05-27 NOTE — TELEPHONE ENCOUNTER
Refill Approved    Rx renewed per Medication Renewal Policy. Medication was last renewed on 3/13/19 .    Arianna Noel, [facility] Triage/Med Refill 4/6/2019     Requested Prescriptions   Pending Prescriptions Disp Refills     spironolactone (ALDACTONE) 25 MG tablet [Pharmacy Med Name: SPIRONOLACT TAB 25MG] 28 tablet 97     Sig: TAKE 1 TABLET BY MOUTH EVERY MORNING    Diuretics/Combination Diuretics Refill Protocol  Passed - 4/5/2019 12:43 PM       Passed - Visit with PCP or prescribing provider visit in past 12 months    Last office visit with prescriber/PCP: 3/13/2019 Danis Kenny MD OR same dept: 3/13/2019 Danis Kenny MD OR same specialty: 3/13/2019 Danis Kenny MD  Last physical: Visit date not found Last MTM visit: Visit date not found   Next visit within 3 mo: Visit date not found  Next physical within 3 mo: Visit date not found  Prescriber OR PCP: Danis Kenny MD  Last diagnosis associated with med order: 1. Adrenal nodule (H)  - spironolactone (ALDACTONE) 25 MG tablet [Pharmacy Med Name: SPIRONOLACT TAB 25MG]; TAKE 1 TABLET BY MOUTH EVERY MORNING  Dispense: 28 tablet; Refill: 97    If protocol passes may refill for 12 months if within 3 months of last provider visit (or a total of 15 months).            Passed - Serum Potassium in past 12 months     Lab Results   Component Value Date    Potassium 3.8 03/13/2019            Passed - Serum Sodium in past 12 months     Lab Results   Component Value Date    Sodium 141 03/13/2019            Passed - Blood pressure on file in past 12 months    BP Readings from Last 1 Encounters:   03/13/19 136/74            Passed - Serum Creatinine in past 12 months     Creatinine   Date Value Ref Range Status   03/13/2019 0.87 0.70 - 1.30 mg/dL Final

## 2021-05-27 NOTE — TELEPHONE ENCOUNTER
INR result is 2.64  INR   Date Value Ref Range Status   03/27/2019 2.64 (H) 0.90 - 1.10 Final       Will the patient be seen, or did they already see, MD or CNP today? No    Most Recent Warfarin dose day/week  Sunday Monday Tuesday Wednesday Thursday Friday Saturday      2.5 5 2.5 5     Sunday Monday Tuesday Wednesday Thursday Friday Saturday   5 2.5 5           Has the patient missed any doses of Coumadin, Warfarin, Jantoven in the past 7 days? No    Has the patients medications changed since the last visit? No    Has the patient experienced any bleeding recently? No    Has the patient experienced any injuries or illness recently? No    Has the patient experienced any 'new' shortness of breath, severe headaches, or changes in vision recently? No    Has the patient had any changes in their diet, or alcohol consumption? No    Is the patient here today to prepare for any type of upcoming surgery, procedure, or for a cardioversion procedure? No    What phone number can we reach the patient at today? Please call Naism back with dosing.

## 2021-05-27 NOTE — TELEPHONE ENCOUNTER
ANTICOAGULATION  MANAGEMENT- Home Care/Care Facility Result    Assessment     Today's INR result of 2.64 is Therapeutic (goal INR of 2.0-3.0)        Warfarin taken as previously instructed    No new diet changes affecting INR    No new medication/supplements affecting INR    Continues to tolerate warfarin with no reported s/s of bleeding or thromboembolism     Previous INR was Therapeutic    Plan:     Spoke with Nasim from [facility] discussed INR result and instructed:     Warfarin Dosing Instructions: Continue current warfarin dose 2.5 mg daily on Mon/Wed/Fri; and 5 mg daily rest of week  (0 % change)    Next INR to be drawn: two weeks    Education provided: importance of therapeutic range    Nasim verbalizes understanding and agrees to warfarin dosing plan.   ?   Lilia Raman RN    Subjective/Objective:      Axel Gonzales, a 86 y.o. male is established on warfarin.     Home care/care facility RN's report of Axel INR, recent warfarin dosing, diet changes, medication changes, and symptoms is documented below.    Additional findings: none    Anticoagulation Episode Summary     Current INR goal:   2.0-3.0   TTR:   59.2 % (3.3 y)   Next INR check:   4/10/2019   INR from last check:   2.64 (3/27/2019)   Weekly max warfarin dose:      Target end date:      INR check location:      Preferred lab:      Send INR reminders to:   ANTICOAGULATION POOL A (WBY,WBE,MID,RSC)    Indications    Long term (current) use of anticoagulants [Z79.01]  Pulmonary emboli (H) (Resolved) [I26.99]           Comments:            Anticoagulation Care Providers     Provider Role Specialty Phone number    Danis Kenny MD  Internal Medicine [phone]

## 2021-05-27 NOTE — TELEPHONE ENCOUNTER
INR result was 2.91 on 4/10/19  INR   Date Value Ref Range Status   04/10/2019 2.91 (H) 0.90 - 1.10 Final       Will the patient be seen, or did they already see, MD or CNP today? No    Most Recent Warfarin dose day/week  Sunday Monday Tuesday Wednesday Thursday Friday Saturday        2.5 5     Sunday Monday Tuesday Wednesday Thursday Friday Saturday   5 2.5 5 2.5 5         Has the patient missed any doses of Coumadin, Warfarin, Jantoven in the past 7 days? No    Has the patients medications changed since the last visit? No    Has the patient experienced any bleeding recently? No    Has the patient experienced any injuries or illness recently? No    Has the patient experienced any 'new' shortness of breath, severe headaches, or changes in vision recently? No    Has the patient had any changes in their diet, or alcohol consumption? No    Is the patient here today to prepare for any type of upcoming surgery, procedure, or for a cardioversion procedure? No    What phone number can we reach the patient at today? Please contact Kelton with the [facility] at [phone].

## 2021-05-28 ENCOUNTER — RECORDS - HEALTHEAST (OUTPATIENT)
Dept: ADMINISTRATIVE | Facility: CLINIC | Age: 86
End: 2021-05-28

## 2021-05-28 NOTE — TELEPHONE ENCOUNTER
Letter created with below information and faxed to [facility] at [phone] along with face to face from 3/13/19.

## 2021-05-28 NOTE — PROGRESS NOTES
ASSESSMENT:  1. Psoriatic arthritis (H)  Doing well on methotrexate and prednisone.  Update labs.  Consider taper of prednisone or pushing methotrexate now that he is in structured assisted living    2. Adrenal nodule (H)  Recheck labs now on Spironolactone    3. Essential hypertension  Edema markedly better.  Recheck labs now on Spironolactone    4. Impaired fasting glucose  Monitor blood sugars    5. Long term (current) use of anticoagulants  Discussed trial of Xarelto to replace warfarin for ease of assisted living and cost.  Discussed with Pharm.D.  Will send in prescription for Xarelto and ask pharmacy to price this to determine coverage and cost    6. Lymphedema of right lower extremity  Markedly decreased on increased diuretics.  Recheck renal function      PLAN:  Patient Instructions   We could explore different blood thinners like xarelto which you take one a day, and no INR and no monitoring    Update blood tests today    Monthly INR         No orders of the defined types were placed in this encounter.    There are no discontinued medications.    Return in about 3 months (around 8/15/2019) for for a full review of everything we did today.      CHIEF COMPLAINT:  Chief Complaint   Patient presents with     Follow-up       HISTORY OF PRESENT ILLNESS:  Axel is a 86 y.o. male presenting to the clinic today to follow up on his anticoagulation, edema, mobility, hypertension, and fatigue. He is accompanied by his wife.     Anticoagulation: He thinks he has been on warfarin for three or four years, but he is not positive. They were going to take over INR monitoring at his living facility, but they were going to charge an extra $500 per month, so they will just come to the clinic instead. Their retired son is able to bring him in to the clinic, so it is not too much of a hassle.      Edema: His leg swelling is greatly improved since he was here two months ago. They are no longer wrapping them, but he still wears  "compression stockings. He has an aid that puts them on for him. His weight is down eight pounds since he was here in March.     Limited Mobility: He has had a difficult time getting a motorized wheelchair through insurance. In the meantime, they have been renting one, which has been great. He has been much more active as a result.     Hypertension: He stopped lisinopril and was started on losartan-HCTZ instead the last time he was here. His blood pressure has been in a good range, and he denies dizziness.     Fatigue: He was restarted on Adderall the last time he was here, and he seems to have more energy. He is still tired, but he \"feels good.\"     REVIEW OF SYSTEMS:   He is feeling well, overall. He is missing a hearing aid, so he is having some trouble hearing today. He denies constipation or other bowel problems. He has not been coughing at all. Denies sinus symptoms. He is not getting vitamin B12 shots at his living facility. He does not have much pain or muscle aches. The only arthritis pain he has is in his right knee. All other systems are negative.    PFSH:  Reviewed, as below.     TOBACCO USE:  Social History     Tobacco Use   Smoking Status Former Smoker   Smokeless Tobacco Never Used       VITALS:  Vitals:    05/15/19 1203   BP: 138/80   Patient Site: Left Arm   Patient Position: Sitting   Cuff Size: Adult Regular   Pulse: 90   SpO2: 97%   Weight: 221 lb 8 oz (100.5 kg)   Height: 5' 10\" (1.778 m)     Wt Readings from Last 3 Encounters:   05/15/19 221 lb 8 oz (100.5 kg)   03/13/19 (!) 229 lb (103.9 kg)   12/21/18 (!) 231 lb (104.8 kg)     Body mass index is 31.78 kg/m .    PHYSICAL EXAM:  Constitutional:  Reveals an alert, pleasant, elderly man who is hard of hearing. Affect appropriate. Presents in a wheelchair. Vitals:  Per nursing notes.  Cardiac:  Regular rate and rhythm without murmurs, rubs, or gallops. Legs with trace edema bilaterally. Palpation of the radial pulse regular.  Neurologic:  Cranial " nerves II-XII intact.     Psychiatric:  Mood appropriate, memory intact.       MEDICATIONS:  Current Outpatient Medications   Medication Sig Dispense Refill     acetaminophen (TYLENOL) 325 MG tablet Take 2 tablets (650 mg total) by mouth every 4 (four) hours as needed.  0     cholecalciferol, vitamin D3, 5,000 unit capsule TAKE 1 CAPSULE BY MOUTH ONCE DAILY 31 capsule 11     dextroamphetamine-amphetamine (ADDERALL XR) 15 MG 24 hr capsule Take 1 capsule (15 mg total) by mouth daily. 30 capsule 0     ferrous sulfate 325 (65 FE) MG tablet TAKE 1 TABLET BY MOUTH ONCE DAILY 31 tablet 11     folic acid (FOLVITE) 1 MG tablet TAKE TWO TABLETS (2000MCG) BY MOUTH ONCE DAILY 62 tablet 11     furosemide (LASIX) 40 MG tablet TAKE 1 TABLET BY MOUTH ONCE DAILY 31 tablet 11     liver oil-zinc oxide (DESITIN) ointment Apply topically daily as needed for dry skin. 56.7 g 12     losartan-hydrochlorothiazide (HYZAAR) 100-25 mg per tablet Take 1 tablet by mouth daily. 90 tablet 3     methotrexate 15 MG tablet Take 15 mg by mouth once a week.       omeprazole (PRILOSEC) 20 MG capsule TAKE 1 CAPSULE BY MOUTH ONCE DAILY 31 capsule 11     predniSONE (DELTASONE) 5 MG tablet TAKE 1 TABLET BY MOUTH ONCE DAILY 31 tablet 11     spironolactone (ALDACTONE) 25 MG tablet Take 1 tablet (25 mg total) by mouth every morning. 90 tablet 3     triamcinolone (KENALOG) 0.1 % cream Apply to legs each night (Patient taking differently: Apply to legs each night.      ) 85.2 g 11     warfarin (COUMADIN/JANTOVEN) 2.5 MG tablet Take 2.5 mg every Mon, Wed, Fri; 5 mg all other days 60 tablet 0     Current Facility-Administered Medications   Medication Dose Route Frequency Provider Last Rate Last Dose     cyanocobalamin injection 1,000 mcg  1,000 mcg Intramuscular Q30 Days Danis Kenny MD   1,000 mcg at 06/19/18 1051       ADDITIONAL HISTORY SUMMARIZED (2): Additional information provided by his wife regarding mobility, swelling, and living  situation  DECISION TO OBTAIN EXTRA INFORMATION (1): None.   RADIOLOGY TESTS (1): None.  LABS (1): Labs from 3/13/2019 reviewed. Labs ordered.   MEDICINE TESTS (1): None.  INDEPENDENT REVIEW (2 each): None.     The visit lasted a total of 17 minutes face to face with the patient. Over 50% of the time was spent counseling and educating the patient about his edema and anticoagulation.    IReilly, am scribing for and in the presence of, Dr. Kenny.    I, Dr. Kenny, personally performed the services described in this documentation, as scribed by Reilly Robert in my presence, and it is both accurate and complete.    Total data points: 3

## 2021-05-28 NOTE — TELEPHONE ENCOUNTER
Who is calling:  Evelina from [facility] and patient's son, Max  Reason for Call:  Caller stated they still need the quantitative strength measurements on the patient's legs and arms. Caller stated these are actual numbers. Caller stated the office note on 3/13/19 that was faxed to them, just lists that patient has weakness. Please fax the strength measurements to [phone]. See the telephone encounter on 5/10/19.  Date of last appointment with primary care: 5/15/19  Okay to leave a detailed message: No  [phone]

## 2021-05-28 NOTE — TELEPHONE ENCOUNTER
Who is calling:  Patient's sonMax  Reason for Call:  Caller stated Deniz Montemayor is asking to get the patient's strength measurement of his arms and legs and patient's pain scale. Caller stated when patient sits down, his pain is about a 4-5. Caller stated patient can't stand up so when he stands, he thinks patient's pain is about an 8. Caller stated he just talked to rosio Montemayor and they told him they do not have this information from Danis Kenny MD.    Caller stated this information needs to be faxed to [phone]. Please call him with questions.  Date of last appointment with primary care: 3/13/19  Okay to leave a detailed message: Yes  [phone]

## 2021-05-28 NOTE — TELEPHONE ENCOUNTER
Who is calling:  JUAN FROM [facility]  Reason for Call:  LETTING HIM KNOW AGENCY DISCHARGED AS OF 05/07/19 DUE TO GOALS BEING MEET   Date of last appointment with primary care: 03/13/19  Okay to leave a detailed message: Yes

## 2021-05-28 NOTE — TELEPHONE ENCOUNTER
Who is calling:  Yelena  Reason for Call:  Patient refusing blood draws there at [facility], and wants his PCP office to take them.  Date of last appointment with primary care: [id number]  Okay to leave a detailed message: Yes

## 2021-05-28 NOTE — TELEPHONE ENCOUNTER
ANTICOAGULATION  MANAGEMENT    Assessment     Today's INR result of 2.3 is Therapeutic (goal INR of 2.0-3.0)        Warfarin taken as previously instructed    No new diet changes affecting INR    No new medication/supplements affecting INR    Continues to tolerate warfarin with no reported s/s of bleeding or thromboembolism     Previous INR was Supratherapeutic     Giorgio is planning to have inrs at clinic, med set up with [facility]     Phoned to [facility] nursing office and [facility]    Plan:     Left a detailed message for Axel and Samantha regarding INR result and instructed:     Warfarin Dosing Instructions:  Continue current warfarin dose 5 mg daily on mon/thu/sat; and 2.5 mg daily rest of week  (0 % change)    Instructed patient to follow up no later than: two weeks with ov      Instructed to call the [facility] for any changes, questions or concerns. (#[phone])   ?   Angel Holly RN    Subjective/Objective:      Axel Gonzales, a 86 y.o. male is on warfarin.     Axel reports:     Home warfarin dose: as updated on anticoagulation calendar per template     Missed doses: No     Medication changes:  No     S/S of bleeding or thromboembolism:  No     New Injury or illness:  No     Changes in diet or alcohol consumption:  No     Upcoming surgery, procedure or cardioversion:  No    Anticoagulation Episode Summary     Current INR goal:   2.0-3.0   TTR:   59.4 % (3.4 y)   Next INR check:   5/15/2019   INR from last check:   2.30 (5/2/2019)   Weekly max warfarin dose:      Target end date:      INR check location:      Preferred lab:      Send INR reminders to:   ANTICOAGULATION POOL A (WBY,WBE,MID,RSC)    Indications    Long term (current) use of anticoagulants [Z79.01]  Pulmonary emboli (H) (Resolved) [I26.99]           Comments:            Anticoagulation Care Providers     Provider Role Specialty Phone number    Danis Kenny MD  Internal Medicine [phone]

## 2021-05-28 NOTE — TELEPHONE ENCOUNTER
Talked with Samantha, Don will come to clinic for inrs and do his own med setup. Has inr scheduled for tomorrow

## 2021-05-28 NOTE — TELEPHONE ENCOUNTER
ANTICOAGULATION  MANAGEMENT    Assessment     Today's INR result of 1.9 is Subtherapeutic (goal INR of 2.0-3.0)        Warfarin taken as previously instructed    No new diet changes affecting INR    No new medication/supplements affecting INR    Continues to tolerate warfarin with no reported s/s of bleeding or thromboembolism     Previous INR was Therapeutic    Plan:     Spoke with Axel regarding INR result and instructed:     Warfarin Dosing Instructions:  Change warfarin dose to 2.5 mg daily on sun/tue/thu; and 5 mg daily rest of week  (10 % change)    Instructed patient to follow up no later than: two weeks      Instructed to call the [facility] for any changes, questions or concerns. (#[phone])   ?   Angel Holly RN    Subjective/Objective:      Axel Gonzales, a 86 y.o. male is on warfarin.     Axel reports:     Home warfarin dose: as updated on anticoagulation calendar per template     Missed doses: No     Medication changes:  No     S/S of bleeding or thromboembolism:  No     New Injury or illness:  No     Changes in diet or alcohol consumption:  No     Upcoming surgery, procedure or cardioversion:  No    Anticoagulation Episode Summary     Current INR goal:   2.0-3.0   TTR:   59.6 % (3.4 y)   Next INR check:   5/29/2019   INR from last check:   1.90! (5/15/2019)   Weekly max warfarin dose:      Target end date:      INR check location:      Preferred lab:      Send INR reminders to:   ANTICOAGULATION POOL A (WBY,WBE,MID,RSC)    Indications    Long term (current) use of anticoagulants [Z79.01]  Pulmonary emboli (H) (Resolved) [I26.99]           Comments:            Anticoagulation Care Providers     Provider Role Specialty Phone number    Danis Kenny MD  Internal Medicine [phone]

## 2021-05-28 NOTE — TELEPHONE ENCOUNTER
Controlled Substance Refill Request  Medication Name:   Requested Prescriptions     Pending Prescriptions Disp Refills     dextroamphetamine-amphetamine (ADDERALL XR) 15 MG 24 hr capsule 30 capsule 0     Sig: Take 1 capsule (15 mg total) by mouth daily.     Date Last Fill: 3/13/2019  Pharmacy: [facility]      Submit electronically to pharmacy  Controlled Substance Agreement Date Scanned:   Encounter-Level CSA Scan Date - 11/08/2016:    Scan on 11/11/2016  8:10 AM (below)             Encounter-Level CSA Scan Date - 10/03/2016:    Scan on 10/3/2016 (below)         Last office visit with prescriber/PCP: 3/13/2019 Danis Kenny MD OR same dept: 3/13/2019 Danis Kenny MD OR same specialty: 3/13/2019 Danis Kenny MD  Last physical: Visit date not found Last MTM visit: Visit date not found      Facility reports the patient is out of medication. Please send a new prescription asap!

## 2021-05-28 NOTE — PATIENT INSTRUCTIONS - HE
We could explore different blood thinners like xarelto which you take one a day, and no INR and no monitoring    Update blood tests today    Monthly INR

## 2021-05-28 NOTE — TELEPHONE ENCOUNTER
INR result is 3.24  INR   Date Value Ref Range Status   04/23/2019 3.24 (H) 0.90 - 1.10 Final       Will the patient be seen, or did they already see, MD or CNP today? No    Most Recent Warfarin dose day/week  Sunday Monday Tuesday Wednesday Thursday Friday Saturday     5 2.5 5 2.5 5     Sunday Monday Tuesday Wednesday Thursday Friday Saturday   5 2.5            Has the patient missed any doses of Coumadin, Warfarin, Jantoven in the past 7 days? No    Has the patients medications changed since the last visit? No    Has the patient experienced any bleeding recently? No    Has the patient experienced any injuries or illness recently? No    Has the patient experienced any 'new' shortness of breath, severe headaches, or changes in vision recently? No    Has the patient had any changes in their diet, or alcohol consumption? No    Is the patient here today to prepare for any type of upcoming surgery, procedure, or for a cardioversion procedure? No    What phone number can we reach the patient at today? Please call Moira back with dosing.

## 2021-05-28 NOTE — TELEPHONE ENCOUNTER
ANTICOAGULATION  MANAGEMENT- Home Care/Care Facility Result    Assessment     Today's INR result of 3.2 is Supratherapeutic (goal INR of 2.0-3.0)        Warfarin taken as previously instructed    No new diet changes affecting INR    No new medication/supplements affecting INR    Continues to tolerate warfarin with no reported s/s of bleeding or thromboembolism     Previous INR was Therapeutic    Plan:     Spoke with nurse Moira discussed INR result and instructed:     Warfarin Dosing Instructions: Change warfarin dose to 2.5 mg daily on mon/tue/wed/fri; and 5 mg daily rest of week  (9 % change)    Next INR to be drawn: one week      Moira verbalizes understanding and agrees to warfarin dosing plan.   ?   Angel Holly RN    Subjective/Objective:      Axel Gonzales, a 86 y.o. male is established on warfarin.     Home care/care facility RN's report of Axel INR, recent warfarin dosing, diet changes, medication changes, and symptoms is documented below.    Additional findings: none    Anticoagulation Episode Summary     Current INR goal:   2.0-3.0   TTR:   59.3 % (3.4 y)   Next INR check:   4/30/2019   INR from last check:   3.24! (4/23/2019)   Weekly max warfarin dose:      Target end date:      INR check location:      Preferred lab:      Send INR reminders to:   ANTICOAGULATION POOL A (WBY,WBE,MID,RSC)    Indications    Long term (current) use of anticoagulants [Z79.01]  Pulmonary emboli (H) (Resolved) [I26.99]           Comments:            Anticoagulation Care Providers     Provider Role Specialty Phone number    Danis Kenny MD  Internal Medicine [phone]

## 2021-05-28 NOTE — TELEPHONE ENCOUNTER
Who is calling:  Max Evgeny  Reason for Call:   Hoallan still needs the strength measurements of the arms and legs please fax this information to the same fax number  Fax #  [phone]  Date of last appointment with primary care:   5/15/2019  Okay to leave a detailed message: Yes  [phone]        Please call son when this is faxed over to ver round

## 2021-05-28 NOTE — TELEPHONE ENCOUNTER
Orders being requested: Bordered Telfa application once a week for scabbed over wounds, Skilled Nursing Visits for two times a week for one week and then once a week for three weeks  Reason service is needed/diagnosis: wound care  When are orders needed by: today  Where to send Orders: verbal is ok  Okay to leave detailed message?  Yes

## 2021-05-29 NOTE — TELEPHONE ENCOUNTER
Per PCP's nurse, strength measurements are taken by physical therapy.  Will explain this to pt/pt's son at [facility] today.

## 2021-05-29 NOTE — TELEPHONE ENCOUNTER
Elena @ [facility] called and informed we do not have information re: extremity strength as pt refused PT thru home care in 1/2019. Relayed to Elena that home care did not recommend power wheelchair due to safety concerns with unsafe transfers

## 2021-05-29 NOTE — TELEPHONE ENCOUNTER
ANTICOAGULATION  MANAGEMENT: Discharge Continuity of Care Review    Emergency room visit on  5/27 for abdominal pain.    Discharge disposition: Home    INR Results:       Recent labs: (last 7 days)     05/27/19  1350   INR 2.58*       Warfarin inpatient management: Home regimen continued    Warfarin discharge instructions: home regimen continued     Medication Changes Affecting Anticoagulation: No    Additional Factors Affecting Anticoagulation: No    Plan     No adjustment to anticoagulation plan needed    Spoke with wife Samantha    Anticoagulation calendar updated    Angel Holly RN

## 2021-05-29 NOTE — PATIENT INSTRUCTIONS - HE
Blood is ok thru ER    Stop iron    Stop folic acid    Stop methotrexate    Stop furosemide    Stop losartan/hctz    Increase Prednisone to 10 mgs each am    Increase spironolactone to 50 mgs each am

## 2021-05-29 NOTE — TELEPHONE ENCOUNTER
No documentation received from home care as of 6/6/19      Elena @ [facility] called and informed we do not have information re: extremity strength as pt refused PT thru home care in 1/2019. Relayed to Elena that home care did not recommend power wheelchair due to safety concerns with unsafe transfers

## 2021-05-29 NOTE — TELEPHONE ENCOUNTER
Provider Review: Anticoagulation Annual Referral Renewal    ACM Renewal Decision:  Renew ACM warfarin management      INR Range:   Continue management at current INR goal   Anticipated Duration of Therapy (from today):  Long-term anticoagulation      Danis Kenny MD  1:32 PM

## 2021-05-29 NOTE — TELEPHONE ENCOUNTER
Call placed to Irais at [facility] and left detailed message to request she send us a progress notes re: extremity strength and safety concerns

## 2021-05-29 NOTE — TELEPHONE ENCOUNTER
Who is calling:  ALBERTO Rouse from [facility]  Reason for Call:  Nasim states the patient has not taken Jantoven since Saturday and he was advised that he needs to go in and be seen. Nasim is concerned as she is not sure if the patient has done so yet.  Date of last appointment with primary care: 5-15-19  Okay to leave a detailed message: Yes

## 2021-05-29 NOTE — TELEPHONE ENCOUNTER
Talked with nurse Nasim at [facility] and Giorgio's wife Samantha.  Samantha says they  were thinking about using Xarelto instead of warfarin  but have decided against it. In the meanwhile, she has given him warfarin she had on hand.  Giorgio will resume warfarin 2.5 mg sun/tue/thu and 5 mg other days of the week. We will check an inr in two weeks on June 17.  I told her and left a message with [facility] staff that [facility] should be able to deliver on 6/4.  phoned to [facility] [phone]

## 2021-05-29 NOTE — TELEPHONE ENCOUNTER
Controlled Substance Refill Request  Medication:   Requested Prescriptions     Pending Prescriptions Disp Refills     dextroamphetamine-amphetamine (ADDERALL XR) 15 MG 24 hr capsule [Pharmacy Med Name: AMPHET/DEXTR CAP 15MG ER] 30 capsule 0     Sig: TAKE 1 CAPSULE BY MOUTH ONCE DAILY     Date Last Fill: 4/23/19  Pharmacy: [facility]   Submit electronically to pharmacy    Controlled Substance Agreement on File:   Encounter-Level CSA Scan Date - 11/08/2016:    Scan on 11/11/2016  8:10 AM (below)             Encounter-Level CSA Scan Date - 10/03/2016:    Scan on 10/3/2016 (below)         Last office visit with primary: 5/15/2019

## 2021-05-29 NOTE — TELEPHONE ENCOUNTER
Called placed to [facility] to see if they can answer - Per Mery in PT - Pt was discharged from PT in 1/2019 - She will forward message to Nursing staff and have them address.    Call placed to [facility] to let them know will fax info as soon as wee hear back from [facility]

## 2021-05-29 NOTE — TELEPHONE ENCOUNTER
Who is calling:  Elena Mahmood  Reason for Call:   Elena Mahmood would like the request that has been submitted several times since 4/30/2019 regarding the Matthiasveround returned to her. She is specifically looking for the measurements of the patients' upper and low extremities. She would like a return call to discuss this as well.   Date of last appointment with primary care: 5/15/2019  Okay to leave a detailed message: No

## 2021-05-29 NOTE — TELEPHONE ENCOUNTER
Who is calling:  The patient's son  Reason for Call:  The patient's son states that the patient needs to have his strength measurements of arms and leg measured at today's appointment. The patient's son wants this sent to [facility] so that the patient can move forward getting his chair as it stands currently the patient is not able to move forward without them. The patient son states that it needs to be addressed as they have been working on this since March or April. The patient's son states that he will be willing to have his father be seen wherever needed I.e. Physical therapy or office visit to get this done.     The patient's son also states that the patient's pain scale should be updated and set to them as well.     The patient's son states that he may go to LT to see if they can move quicker to get the wheelchair for the patient and asks that the requested documentation be saved in the patient's chart for easier sending.     The patient son asks that he be called if there are any questions or confusion about what is being requested because he is primarily managing this for the patient. The patient's son ca be reached at [phone].    Date of last appointment with primary care: 5/15/2019  Okay to leave a detailed message: Yes

## 2021-05-29 NOTE — TELEPHONE ENCOUNTER
Contacted [facility] and they states they need the lower and upper extremity strength which is based off a scale of 1 through 5 (1 meaning absence of all strength and 5 meaning has complete strength). Note will need to be addended again with this information and according to [facility] this is the last bit of information needed by insurance, please advise.

## 2021-05-29 NOTE — TELEPHONE ENCOUNTER
Received call from Irais OT @ [facility] - as of their last visit she said Don upper body strength was approx 4/5  And lower ext 4-/5 had several falls while their during visits. Due to falls and inability to safely transfer into electric scooter she doesn't recommend scooter for pt. Pt refused PT/OT and was only during Lympadema tx's and wound care with nursing.

## 2021-05-29 NOTE — TELEPHONE ENCOUNTER
Who is calling:  Nasim  Reason for Call:  Nasim was returning a phone call about the patient.   Date of last appointment with primary care: n/a  Okay to leave a detailed message: Yes

## 2021-05-29 NOTE — TELEPHONE ENCOUNTER
FYI - Status Update  Who is Calling: Home Care  Update: There is a delay in start of care. OT and PT start of care will start tomorrow 6/25/2019  Okay to leave a detailed message?:  No return call needed

## 2021-05-29 NOTE — PROGRESS NOTES
ASSESSMENT:  1.  Generalized muscle weakness.  Unable to get out of chair which is been slowly progressive.  MRI shows no obvious structural abnormalities of the back.  Consider poorly controlled polymyalgia rheumatica.  Consider other medicine side effect or blood pressure too low.  Drug-free holiday with close follow-up    1. Adrenal nodule (H)  Increase Spironolactone.  Discontinue furosemide  - spironolactone (ALDACTONE) 50 MG tablet; Take 1 tablet (50 mg total) by mouth every morning.  Dispense: 30 tablet; Refill: 11    2. Psoriasis  Increase prednisone.  Treat possible polymyalgia rheumatica.  Trial off methotrexate  - predniSONE (DELTASONE) 10 mg tablet; Take 10 mg by mouth daily.  Dispense: 30 tablet; Refill: 11    4.  Low back pain.  Reviewed CT scan.  Trial of increased prednisone.  Pain seems reasonably minimal    5.  Long-term use of anticoagulants.  Reviewed rectus sheath hematoma, CT scan, and ER visit.  Healing.  No longer needs pain medicines    PLAN:  Patient Instructions   Blood is ok thru ER    Stop iron    Stop folic acid    Stop methotrexate    Stop furosemide    Stop losartan/hctz    Increase Prednisone to 10 mgs each am    Increase spironolactone to 50 mgs each am        No orders of the defined types were placed in this encounter.    Medications Discontinued During This Encounter   Medication Reason     rivaroxaban (XARELTO) 20 mg Tab Alternate therapy     ferrous sulfate 325 (65 FE) MG tablet      folic acid (FOLVITE) 1 MG tablet      methotrexate 15 MG tablet      furosemide (LASIX) 40 MG tablet      losartan-hydrochlorothiazide (HYZAAR) 100-25 mg per tablet      oxyCODONE (ROXICODONE) 5 MG immediate release tablet      spironolactone (ALDACTONE) 25 MG tablet      predniSONE (DELTASONE) 5 MG tablet        Return in about 1 month (around 7/16/2019) for for a full review of everything we did today.      CHIEF COMPLAINT:  Chief Complaint   Patient presents with     Follow-up       HISTORY OF  PRESENT ILLNESS:  Axel is a 86 y.o. male presenting to the clinic today with complaints of weakness and a hematoma. He is accompanied by his wife.     Weakness: He is no longer able to stand up on his own because of weakness. His strength has gotten worse over the past couple of months, and his wife states his balance is poor too. He has had dizziness with standing in the past, but he does not stand very quickly anymore. He is not able to think of a specific time where he suddenly became more weak; it has been a gradual change. He is fine when he is sitting or in bed. He has had a few falls and needs to be very careful when standing. Walking is very difficult for him. He has even been close to falling while using a walker. He has an aid that helps him get to the bathroom. He was hospitalized in November of last year after a fall in the bathroom and hitting his head. He was able to regain his strength for a while after that before it started to worsen again in the past few months. He has been renting an electric wheelchair to help him get around. He does not have pain limiting him from standing up; it is all weakness. His wife recalls him feeling better after discontinuing a number of medications in the past.     Hematoma: He was in the ED on 5/27 with complaints of abdominal pain. They did some imaging that showed a rectus sheath hematoma. They gave him Tylenol, but it did not help much. The pain is improved, and he is able to live with it now. He does not usually have a problem with bleeding. He now has some brusing on his lower abdomen. He was coughing a lot prior to this, and they acknowledge he could have torn something from coughing.    Anticoagulation: He continues to anticoagulate on warfarin, but he states he does not know why. 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     Hypertension: His blood pressure has been in a good range. He states that it has always been a little high.     Edema: His  "lower extremity swelling has been well controlled, but his right leg is always worse than the left.     Psoriasis: His psoriasis has been fairly stable.     REVIEW OF SYSTEMS:   He has some pain in his lower legs, but he can live with it. He has a decent amount of energy. He wears an adult diaper because he struggles with incontinence and getting to the bathroom in time. This works well, and he has aids that help him change. He gets shortness of breath after walking to the restroom and back, but he feels fine after sitting down and resting for a little while. He has not been getting vitamin B12 shots at his living facility. He is hard of hearing but would like his left ear checked for cerumen impaction. He is taking an iron supplement and his stools have been black. All other systems are negative.    PFSH:  He lives in assisted living and receives some support from the staff there.     TOBACCO USE:  Social History     Tobacco Use   Smoking Status Former Smoker   Smokeless Tobacco Never Used       VITALS:  Vitals:    06/18/19 1432   BP: 110/66   Patient Site: Left Arm   Patient Position: Sitting   Cuff Size: Adult Large   Pulse: (!) 110   Resp: 16   SpO2: 96%   Weight: 213 lb 4 oz (96.7 kg)   Height: 5' 10\" (1.778 m)     Wt Readings from Last 3 Encounters:   06/18/19 213 lb 4 oz (96.7 kg)   05/27/19 210 lb (95.3 kg)   05/15/19 221 lb 8 oz (100.5 kg)     Body mass index is 30.6 kg/m .    PHYSICAL EXAM:  Constitutional:  Reveals an alert, pleasant, elderly male who is hard of hearing. Presents in a wheelchair. Uses arms to stand up but still unable to fully stand up from wheelchair without assistance.  Vitals:  Per nursing notes.  Ears: Left ear clear. Wears hearing aids.   Cardiac:  Regular rate and rhythm without murmurs, rubs, or gallops. 1+ edema left leg, 2+ right leg. Palpation of the radial pulse regular.  Abdomen: Bruising left flank  Neurologic:  Cranial nerves II-XII intact.     Psychiatric:  Mood " appropriate, memory intact.     MEDICATIONS:  Current Outpatient Medications   Medication Sig Dispense Refill     acetaminophen (TYLENOL) 325 MG tablet Take 2 tablets (650 mg total) by mouth every 4 (four) hours as needed.  0     cholecalciferol, vitamin D3, 5,000 unit capsule TAKE 1 CAPSULE BY MOUTH ONCE DAILY 31 capsule 11     dextroamphetamine-amphetamine (ADDERALL XR) 15 MG 24 hr capsule TAKE 1 CAPSULE BY MOUTH ONCE DAILY 30 capsule 0     liver oil-zinc oxide (DESITIN) ointment Apply topically daily as needed for dry skin. 56.7 g 12     omeprazole (PRILOSEC) 20 MG capsule TAKE 1 CAPSULE BY MOUTH ONCE DAILY 31 capsule 11     predniSONE (DELTASONE) 10 mg tablet Take 10 mg by mouth daily. 30 tablet 11     spironolactone (ALDACTONE) 50 MG tablet Take 1 tablet (50 mg total) by mouth every morning. 30 tablet 11     triamcinolone (KENALOG) 0.1 % cream Apply to legs each night (Patient taking differently: Apply to legs each night.      ) 85.2 g 11     warfarin (COUMADIN/JANTOVEN) 2.5 MG tablet Take 2.5 mg every Mon, Wed, Fri; 5 mg all other days 60 tablet 0     Current Facility-Administered Medications   Medication Dose Route Frequency Provider Last Rate Last Dose     cyanocobalamin injection 1,000 mcg  1,000 mcg Intramuscular Q30 Days Danis Kenny MD   1,000 mcg at 06/19/18 1051       ADDITIONAL HISTORY SUMMARIZED (2): Reviewed 5/27/2019 ED note regarding abdominal pain. Reviewed November 2018 hospital notes regarding fall.   DECISION TO OBTAIN EXTRA INFORMATION (1): None.   RADIOLOGY TESTS (1): Reviewed 5/27/2019 CT abdomen showing rectal sheath hematoma.   LABS (1): Labs from May 2019 reviewed; CBC, CMP, A1c  MEDICINE TESTS (1): None.  INDEPENDENT REVIEW (2 each): None.     The visit lasted a total of 30 minutes face to face with the patient. Over 50% of the time was spent counseling and educating the patient about his weakness.    Reilly SNIGH, am scribing for and in the presence of, Dr. Kenny.    FRANCISCO  Dr. Kenny, personally performed the services described in this documentation, as scribed by Reilly Robert in my presence, and it is both accurate and complete.    Total data points: 4

## 2021-05-29 NOTE — TELEPHONE ENCOUNTER
"Anticoagulation Annual Referral Renewal Review    Axel Gonzales's chart reviewed for annual renewal of referral to anticoagulation monitoring.        Criteria for anticoagulation nurse and/or pharmacist renewal met   Warfarin indication: Atrial Fibrillation Yes, per indication   Current with INR monitoring/compliant Yes Yes   Date of last office visit 5/15/19 Yes, had office visit within last year   Time in Therapeutic Range (TTR) 51.5 % No, TTR < 60 %       Axel Gonzales did NOT meet all criteria for anticoagulation management program initiated renewal and requires provider review. Using dot phrase, \".acmrenewalprovider\", please advise if Axel's anticoagulation management referral should be renewed or if patient should be seen in office to review anticoagulation therapy      Angel Holly RN  5:26 PM      "

## 2021-05-30 ENCOUNTER — RECORDS - HEALTHEAST (OUTPATIENT)
Dept: ADMINISTRATIVE | Facility: CLINIC | Age: 86
End: 2021-05-30

## 2021-05-30 NOTE — TELEPHONE ENCOUNTER
ANTICOAGULATION  MANAGEMENT PROGRAM    Axel Gonzales is being discharged from the [facility] Anticoagulation Management Program (ACM).    Reason for discharge:     ACM referral closed, anticoagulation episode resolved and INR standing order discontinued.         Angel Holly RN

## 2021-05-30 NOTE — TELEPHONE ENCOUNTER
FYI - Status Update  Who is Calling: Spouse  Update: Requests a call back from MICHEL Casas to leave a detailed message?:  Yes

## 2021-05-30 NOTE — TELEPHONE ENCOUNTER
ANTICOAGULATION  MANAGEMENT    Assessment     Today's INR result of 7.02 is Supratherapeutic (goal INR of 2.0-3.0)        More warfarin taken than instructed which may be affecting INR. Patient's wife CODY has been giving him her own warfarin since the pharmacy did not send warfarin for patient. Unsure how much she gave him.     No new diet changes affecting INR    No new medication/supplements affecting INR    Continues to tolerate warfarin with no reported s/s of bleeding or thromboembolism     Previous INR was Therapeutic    Plan:     Spoke with wife Lior and nurse Nasim at [facility] regarding INR result and instructed:     Warfarin Dosing Instructions:  Hold today and tmr 7/10.    Instructed patient to follow up no later than: Thurs 7/11.     Education provided: monitoring for bleeding signs and symptoms and when to seek medical attention/emergency care    CODY and Nasim verbalize understanding and agrees to warfarin dosing plan.    Instructed to call the [facility] for any changes, questions or concerns. (#[phone])   ?   Karir Barajas RN    Subjective/Objective:      Axel SAGE Gonzales, a 86 y.o. male is on warfarin.     Axel reports:     Home warfarin dose:      Missed doses: No     Medication changes:  No     S/S of bleeding or thromboembolism:  No     New Injury or illness:  No     Changes in diet or alcohol consumption:  No     Upcoming surgery, procedure or cardioversion:  No    Anticoagulation Episode Summary     Current INR goal:   2.0-3.0   TTR:   58.2 % (3.6 y)   Next INR check:   7/11/2019   INR from last check:   7.02! (7/9/2019)   Weekly max warfarin dose:      Target end date:      INR check location:      Preferred lab:      Send INR reminders to:   [facility]    Indications    Long term (current) use of anticoagulants [Z79.01]  Pulmonary emboli (H) (Resolved) [I26.99]           Comments:            Anticoagulation Care Providers     Provider Role Specialty Phone number     Danis Kenny MD  Internal Medicine [phone]

## 2021-05-30 NOTE — TELEPHONE ENCOUNTER
Talked with Samantha and left message with [facility] nursing. I want to make sure we are on the same page with ordering warfarin and administering Don's meds    Don will hold warfarin until inr tomorrow. He could not come today for inr

## 2021-05-30 NOTE — TELEPHONE ENCOUNTER
Venous INR 7.02 today.    Pt denied any s/sx of bleeding.     Plan to hold warfarin today and tomorrow. Recheck INR on Wed 7/11.     Does PCP agree with plan?

## 2021-05-30 NOTE — TELEPHONE ENCOUNTER
Who is calling:  nurse Nasim from the [facility] facility   Reason for Call:  Nasim nurse from [facility] facility called with requesting for warfarin.  She states they do not have orders for patient's wafarin medication dosing, as well as the patient may be in need of a refill of the medication.    She is requesting new dosing orders be faxed to them for their records once patient completes his appointment 7/12/19.  She also wants to make sure a refill of the prescription is sent to the [facility] pharmacy for the patient.  Pharmacy fax is [phone].   Date of last appointment with primary care: 06/18/19  Okay to leave a detailed message: Yes

## 2021-05-30 NOTE — TELEPHONE ENCOUNTER
"ANTICOAGULATION  MANAGEMENT    Axel Gonzales is on warfarin and had a point of care INR result > 5.5 or an \"error message\" on point of care meter today.    Afternoon INR; STAT venous INR processing and STAT  requested from lab    Spoke with patient's wife via phone while at the lab and reviewed triage questions for potential signs and symptoms of bleeding.      Patient Response     Have you had any bleeding in the last week?   No   Have you passed any red, black, or tarry stools in last week?   No   Have you vomited/spit up any red or coffee ground material in last week?   No   Have you had any new, severe abdominal pain or bloating develop in last week?   No   Have you fallen or had any injuries in last week?   No   Do you currently have a severe, sudden onset headache?   No   Do you currently have any severe sudden changes in your vision?   No   Do you currently have any new onset numbness, weakness/paralysis?   No     Assessment/Plan:     Axel's responses were negative for signs and symptoms of bleeding; may discharge from clinic    Axel instructed to:       Hold warfarin today until venous INR result returns and receives follow up call from [facility]    Seek medical attention for new signs and symptoms of bleeding or a fall with injury.       "

## 2021-05-30 NOTE — TELEPHONE ENCOUNTER
FABIOLA Kenny,    Venous INR 7.02 today.    Pt denied any s/sx of bleeding.     ACN instructed to hold warfarin today and tomorrow. Recheck INR on Thurs 7/11.     PCP please advise if there's any different or additional instructions.     Thank you,    Karri Barajas RN

## 2021-05-30 NOTE — TELEPHONE ENCOUNTER
INITIAL INR > 5.5 NOTIFICATION- Anticoagulation Management Program    Axel Gonzales had an initial point of care INR of >8.0.     Venous confirmation of INR required per protocol. STAT venous sample drawn and being sent out for confirmation.    Anticoagulation template scanned into EHR. Patient phone call with Anticoagulation Management Program (ACM) being arranged for patient triage prior to discharge. Dr Kenny notified of result     Clarisa Edgar MLT

## 2021-05-30 NOTE — TELEPHONE ENCOUNTER
Orders being requested: PRN cream for bottom and also pt and ot with weaking of the legs .   Reason service is needed/diagnosis: wreaking of legs and open sore on bottom   When are orders needed by: as soon as possible   Where to send Orders: fax- [phone]  Okay to leave detailed message?  Yes

## 2021-05-30 NOTE — TELEPHONE ENCOUNTER
Controlled Substance Refill Request  Medication:   Requested Prescriptions     Pending Prescriptions Disp Refills     dextroamphetamine-amphetamine (ADDERALL XR) 15 MG 24 hr capsule [Pharmacy Med Name: AMPHET/DEXTR CAP 15MG ER] 30 capsule 0     Sig: TAKE 1 CAPSULE BY MOUTH ONCE DAILY     Date Last Fill: 5/29/19  Pharmacy: [facility]   Submit electronically to pharmacy  Controlled Substance Agreement on File:   Encounter-Level CSA Scan Date - 11/08/2016:    Scan on 11/11/2016  8:10 AM (below)             Encounter-Level CSA Scan Date - 10/03/2016:    Scan on 10/3/2016 (below)         Last office visit: Last office visit pertaining to requested medication was 6/18/19.

## 2021-05-30 NOTE — TELEPHONE ENCOUNTER
ANTICOAGULATION  MANAGEMENT PROGRAM    Axel Gonzales is overdue for INR check.  Reminder call made.    Left message for Axel. If returning call, please schedule INR check as soon as possible.    Angel Holly RN

## 2021-05-30 NOTE — TELEPHONE ENCOUNTER
ANTICOAGULATION  MANAGEMENT    Assessment     Today's INR result of 4.7 is Supratherapeutic (goal INR of 2.0-3.0)        More warfarin taken than instructed which may be affecting INR confirmed with Nasim at [facility] that warfarin was administered last week as ordered. wife said she thought Giorgio was out of warfarin at [facility] so she gave him some she had on hand. May have had double dose last week. We discussed that she should call me before administering any meds from her own cupboard    No new diet changes affecting INR    No new medication/supplements affecting INR    Continues to tolerate warfarin with no reported s/s of bleeding or thromboembolism confirmed    Previous INR was Therapeutic     [facility] pharmacy delivering month supply today to [facility] as they did last month. Confirmed with Nasim at [facility]    Plan:     Spoke with Axel regarding INR result and instructed:     Warfarin Dosing Instructions:  hold today then change warfarin dose to 5 mg daily on mon/wed/fri; and 2.5 mg daily rest of week  (9 % change)    Instructed patient to follow up no later than: one week    Samantha verbalizes understanding and agrees to warfarin dosing plan.    Instructed to call the [facility] for any changes, questions or concerns. (#[phone])   ?   Angel Holly RN    Subjective/Objective:      Axel Gonzales, a 86 y.o. male is on warfarin.     Axel reports:     Home warfarin dose: verbally confirmed home dose with wife Samantha and updated on anticoagulation calendar     Missed doses: held as planned     Medication changes:  No     S/S of bleeding or thromboembolism:  No     New Injury or illness:  No     Changes in diet or alcohol consumption:  No     Upcoming surgery, procedure or cardioversion:  No    Anticoagulation Episode Summary     Current INR goal:   2.0-3.0   TTR:   58.0 % (3.6 y)   Next INR check:   7/19/2019   INR from last check:   4.70! (7/12/2019)   Weekly max warfarin dose:      Target end date:       INR check location:      Preferred lab:      Send INR reminders to:   [facility]    Indications    Long term (current) use of anticoagulants [Z79.01]  Pulmonary emboli (H) (Resolved) [I26.99]           Comments:            Anticoagulation Care Providers     Provider Role Specialty Phone number    Danis Kenny MD  Internal Medicine [phone]

## 2021-05-30 NOTE — TELEPHONE ENCOUNTER
[facility]ler to get more information on requests below. What kind of cream is she requesting for pt?  Barrier cream? Is there are wound on pt's bottom or is the cream d/t incontinence episodes?  Need for info.  Can give verbal okay for OT orders, does Nasim need the OT orders faxed?      PT went to [facility] today.   Does Nasim want these orders now, or wait until pt is discharged?

## 2021-05-31 VITALS — BODY MASS INDEX: 34.23 KG/M2 | WEIGHT: 231.8 LBS

## 2021-05-31 VITALS — BODY MASS INDEX: 36.05 KG/M2 | WEIGHT: 244.1 LBS

## 2021-06-01 ENCOUNTER — RECORDS - HEALTHEAST (OUTPATIENT)
Dept: ADMINISTRATIVE | Facility: CLINIC | Age: 86
End: 2021-06-01

## 2021-06-01 VITALS — WEIGHT: 227.6 LBS | BODY MASS INDEX: 33.61 KG/M2

## 2021-06-01 VITALS — BODY MASS INDEX: 34.11 KG/M2 | WEIGHT: 231 LBS

## 2021-06-01 VITALS — BODY MASS INDEX: 32.96 KG/M2 | WEIGHT: 223.2 LBS

## 2021-06-01 VITALS — WEIGHT: 238.7 LBS | BODY MASS INDEX: 35.25 KG/M2

## 2021-06-02 VITALS — WEIGHT: 229.2 LBS | BODY MASS INDEX: 32.89 KG/M2

## 2021-06-02 VITALS — BODY MASS INDEX: 32.82 KG/M2 | WEIGHT: 228.7 LBS

## 2021-06-02 VITALS — BODY MASS INDEX: 32.71 KG/M2 | WEIGHT: 228 LBS

## 2021-06-02 VITALS — WEIGHT: 229.3 LBS | BODY MASS INDEX: 32.9 KG/M2

## 2021-06-02 VITALS — WEIGHT: 221.5 LBS | HEIGHT: 70 IN | BODY MASS INDEX: 31.71 KG/M2

## 2021-06-02 VITALS — BODY MASS INDEX: 32.71 KG/M2 | HEIGHT: 70 IN

## 2021-06-02 VITALS — HEIGHT: 70 IN | BODY MASS INDEX: 32.78 KG/M2 | WEIGHT: 229 LBS

## 2021-06-02 VITALS — BODY MASS INDEX: 33.45 KG/M2 | WEIGHT: 226.5 LBS

## 2021-06-03 VITALS — WEIGHT: 213.25 LBS | BODY MASS INDEX: 30.53 KG/M2 | HEIGHT: 70 IN

## 2021-06-08 NOTE — PROGRESS NOTES
Spoke to Cherri at [facility] and she said that all that is needed to ov notes where it explains pt needs walker due to condition. Ov note from 11/21/2016 sent

## 2021-06-16 PROBLEM — L97.829: Status: ACTIVE | Noted: 2018-01-01

## 2021-06-16 PROBLEM — N39.0 URINARY TRACT INFECTION: Status: ACTIVE | Noted: 2019-01-01

## 2021-06-16 PROBLEM — Z86.711 PERSONAL HISTORY OF PE (PULMONARY EMBOLISM): Status: ACTIVE | Noted: 2017-07-24

## 2021-06-16 PROBLEM — L40.50 PSORIATIC ARTHRITIS (H): Status: ACTIVE | Noted: 2019-01-01

## 2021-06-16 PROBLEM — R53.1 GENERALIZED WEAKNESS: Status: ACTIVE | Noted: 2019-01-01

## 2021-06-16 PROBLEM — E55.9 VITAMIN D DEFICIENCY: Status: ACTIVE | Noted: 2018-02-16

## 2021-06-16 PROBLEM — L03.115 CELLULITIS OF RIGHT LOWER EXTREMITY: Status: ACTIVE | Noted: 2019-01-01

## 2021-06-16 PROBLEM — D51.0 PERNICIOUS ANEMIA: Status: ACTIVE | Noted: 2018-02-16

## 2021-06-16 PROBLEM — L40.9 PSORIASIS: Status: ACTIVE | Noted: 2018-06-27

## 2021-06-16 PROBLEM — M79.671 RIGHT FOOT PAIN: Status: ACTIVE | Noted: 2019-01-01

## 2021-06-16 PROBLEM — I83.029 VENOUS STASIS ULCER OF LEFT LOWER EXTREMITY (H): Status: ACTIVE | Noted: 2018-01-01

## 2021-06-16 PROBLEM — E27.9 ADRENAL NODULE (H): Status: ACTIVE | Noted: 2018-01-01

## 2021-06-16 PROBLEM — L97.929 VENOUS STASIS ULCER OF LEFT LOWER EXTREMITY (H): Status: ACTIVE | Noted: 2018-01-01

## 2021-06-16 PROBLEM — I89.0 LYMPHEDEMA OF RIGHT LOWER EXTREMITY: Status: ACTIVE | Noted: 2018-02-15

## 2021-06-16 NOTE — TELEPHONE ENCOUNTER
Telephone Encounter by Emma Campbell CMA at 6/26/2019  9:46 AM     Author: Emma Campbell CMA Service: -- Author Type: Certified Medical Assistant    Filed: 6/26/2019  9:50 AM Encounter Date: 6/24/2019 Status: Signed    : Emma Campbell CMA (Certified Medical Assistant)       Medication: Adderall  Last Date Filled 3/14/19   pulled: YES    Only PCP Prescribing? : NO  Taken as prescribed from physician notes? NO   ofv note 3/13/19  Add back adderall 15 mgs each morning which we did back in august for energy    CSA in last year: NO   Random Utox in last year: Not needed  (if any of the above answer NO - schedule with PCP)     Opioids + benzodiazepines? NO  (if the above answer YES - schedule with PCP every 6 months)     Is patient on the Executive Review Team? NO      All responses suggest: Scheduling with PCP for further intervention       Pt was just seen by pcp 6/18/19  PCP to advise

## 2021-06-16 NOTE — TELEPHONE ENCOUNTER
Telephone Encounter by Emma Campbell CMA at 5/29/2019  9:13 AM     Author: Emma Campbell CMA Service: -- Author Type: Certified Medical Assistant    Filed: 5/29/2019  9:19 AM Encounter Date: 5/28/2019 Status: Signed    : Emma Campbell CMA (Certified Medical Assistant)       Medication: Adderall XR   Last Date Filled 3/14/19   pulled: YES    Only PCP Prescribing? : NO  Taken as prescribed from physician notes? YES    CSA in last year: NO  Random Utox in last year: Not needed  (if any of the above answer NO - schedule with PCP)     Opioids + benzodiazepines? NO  (if the above answer YES - schedule with PCP every 6 months)     Is patient on the Executive Review Team? NO      All responses suggest: Scheduling with PCP for further intervention     PCP Please advise pt was just seen 5/15/19 and no CSA was done nothing noted in that note that I could see

## 2021-06-16 NOTE — PROGRESS NOTES
ASSESSMENT:  1.  Hypertension.  Blood pressure well controlled.  Suspect heart rate still too low.  Unable to decrease labetalol any further.  Discontinue labetalol and begin low-dose metoprolol.  Update INR    2.  Edema.  Worsened however he reports stopping diuretics the last 3 days due to urinary frequency.  Trial of decreased beta-blockade.  Resume lisinopril HCTZ.  Trial off furosemide    3.  Low back pain.  With knee pain.  Marked and dramatic improvement with steroid burst lasting until the last few days.  Sed rate and labs normal.  Consider polymyalgia rheumatica.  Repeat trial of longer term lower dose prednisone    PLAN:  Patient Instructions   Retry Prednisone at 10 mgs daily for the next few weeks    Stop Labetalol/Normodyne    Metoprolol 25 mgs once a day to replace it    Go back on Lisinopril/hctz for blood pressure and swelling    Do not take the Furosemide unless you are really swollen      No orders of the defined types were placed in this encounter.    Medications Discontinued During This Encounter   Medication Reason     labetalol (NORMODYNE) 200 MG tablet Therapy completed     Administrations This Visit     cyanocobalamin injection 1,000 mcg     Admin Date Action Dose Route Administered By             03/22/2018 Given 1000 mcg Intramuscular Sailaja Morfin MA                        Return in about 2 weeks (around 4/5/2018).    CHIEF COMPLAINT:  Chief Complaint   Patient presents with     Follow-up     from 3/1/2018, feet and legs are still swollen        HISTORY OF PRESENT ILLNESS:  Axel is a 85 y.o. male presenting to the clinic today for medication management. He is accompanied by his wife.     GERD: After going back onto the omeprazole, his stomach is feeling fine.     Memory Loss or Lapse: He believes his memory is alright. His wife states his memory is better than hers, as he would say.     Frequent Urination: He experiences frequent urination during the day and night. For the past  three weeks, after he gets up at night to got to the bathroom, he feels almost like he has a fever when he goes back to bed,. He has noticed this only in the last 3 weeks. He does not know if it was better or worse with prednisone.     Edema: His swelling is going down beautifully, his wife reports. The swelling in his left leg was much reduced, and reduced less so in his right leg for some time. His wife reports the swelling is back. He states he stopped taking his pills for a while because they made him urinate frequently.    Hypertension:  He has built up fluid for about one year. He has not had a heart attack. He and his wife cannot remember what lobeidilol is and are not sure if he is taking it.     Knee Pain: His knee pain is much reduced. He wonders if he could have a refill of prednisone. He felt perfect until a day or so ago, but felt a few sharp pains recently. Almost immediately, his knee pain improved and his swelling reduced somewhat. His pain went away completely while on prednisone.  His pain is mostly gone. His back pain comes and goes, after improving for a while. His wife reports that he had significant aches this morning.     REVIEW OF SYSTEMS:   Cortisone did alleviated his rash. He does not have much energy.     All other systems are negative.    PFSH:  Pertinent history reviewed.     TOBACCO USE:  History   Smoking Status     Former Smoker   Smokeless Tobacco     Never Used       VITALS:  Vitals:    03/22/18 1340   BP: 122/74   Patient Site: Left Arm   Patient Position: Sitting   Cuff Size: Adult Large   Pulse: 66   Weight: (!) 238 lb 11.2 oz (108.3 kg)     Wt Readings from Last 3 Encounters:   03/22/18 (!) 238 lb 11.2 oz (108.3 kg)   03/01/18 (!) 231 lb 12.8 oz (105.1 kg)   02/15/18 (!) 244 lb 1.6 oz (110.7 kg)     Body mass index is 35.25 kg/(m^2).    PHYSICAL EXAM:  Constitutional:  Reveals an alert man in a wheelchair.  Vitals:  Per nursing notes.  Cardiac:  Regular rate and rhythm without  murmurs, rubs, or gallops. Carotids without bruits. 1 plus 2 plus both sides lower legs, bilaterally. Palpation of the radial pulse regular.  Lungs: Clear.  Respiratory effort normal.  Neurologic:  Cranial nerves II-XII intact.     Psychiatric:  Mood appropriate, memory intact.     QUALITY MEASURES:    ADDITIONAL HISTORY SUMMARIZED (2): Wife offered her assessment of his pain  DECISION TO OBTAIN EXTRA INFORMATION (1): None  RADIOLOGY TESTS (1): None  LABS (1): Reviewed, ordered labs, INR.  MEDICINE TESTS (1): None  INDEPENDENT REVIEW (2 each): None    Total data points: 2    The visit lasted a total of 21 minutes face to face with the patient. Over 50% of the time was spent counseling and educating the patient about edema.    Karyna SINGH, am scribing for and in the presence of, Dr. Kenny.    Dr. Zoya SINGH, personally performed the services described in this documentation, as scribed by Karyna Rodriguez in my presence, and it is both accurate and complete.    MEDICATIONS:  Current Outpatient Prescriptions   Medication Sig Dispense Refill     cholecalciferol, vitamin D3, (VITAMIN D3) 5,000 unit Tab Take 1 tablet (5,000 Units total) by mouth daily. 90 tablet 3     cyanocobalamin, vitamin B-12, 2,000 mcg Tab Take 2,000 mcg by mouth daily. 100 tablet 11     furosemide (LASIX) 40 MG tablet TAKE 1 TABLET (40 MG TOTAL) BY MOUTH DAILY. 90 tablet 3     lisinopril-hydrochlorothiazide (PRINZIDE,ZESTORETIC) 20-25 mg per tablet Take 1 tablet by mouth daily. 90 tablet 3     omeprazole (PRILOSEC) 20 MG capsule Take 1 capsule (20 mg total) by mouth daily before breakfast. 90 capsule 3     potassium chloride (KLOR-CON) 10 MEQ CR tablet TAKE 1 TABLET (10 MEQ TOTAL) BY MOUTH DAILY. 90 tablet 4     triamcinolone (KENALOG) 0.1 % cream Apply to legs each night 85.2 g 11     warfarin (COUMADIN) 5 MG tablet Take 1/2-1 tablet (2.5mg- 5 mg) daily as directed based on inr results 90 tablet 1     metoprolol succinate (TOPROL-XL) 25 MG  Take 1 tablet (25 mg total) by mouth daily. 30 tablet 11     predniSONE (DELTASONE) 10 mg tablet Take 1 tablet (10 mg total) by mouth daily. 30 tablet 2     No current facility-administered medications for this visit.

## 2021-06-16 NOTE — TELEPHONE ENCOUNTER
Telephone Encounter by Gretchen Rowell CMA at 4/23/2019  3:49 PM     Author: Gretchen Rowell CMA Service: -- Author Type: Certified Medical Assistant    Filed: 4/23/2019  4:00 PM Encounter Date: 4/23/2019 Status: Signed    : Gretchen Rowell CMA (Certified Medical Assistant)       Medication: Adderall  Last Date Filled 3/14/2019 for 30 tablets   pulled: YES     Only PCP Prescribing? : NO  Taken as prescribed from physician notes? YES- 3/13/2019- 8. Fatigue, unspecified type  Repeat trial of Adderall.  Previous notes reviewed  - dextroamphetamine-amphetamine (ADDERALL XR) 15 MG 24 hr capsule; Take 1 capsule (15 mg total) by mouth daily.  Dispense: 30 capsule; Refill: 0    CSA in last year: NO  Random Utox in last year: None needed  (if any of the above answer NO - schedule with PCP)     Opioids + benzodiazepines? NO    Is patient on the Executive Review Team? NO      All responses suggest: Scheduling with PCP for further intervention

## 2021-06-16 NOTE — PROGRESS NOTES
ASSESSMENT:  1. Essential hypertension  Stable despite marked decrease in labetalol  - labetalol (NORMODYNE) 200 MG tablet; Take 0.5 tablets (100 mg total) by mouth 2 (two) times a day.  Dispense: 270 tablet; Refill: 3    2.  Edema.  Improved.  Consider improved circulation with decrease labetalol.  Resume hydrochlorothiazide with lisinopril    3.  Insomnia.  Discontinue Tylenol with Benadryl    4.  Esophagitis.  Continue omeprazole    5.  Knee pain.  With back pain.  Trial of steroid burst.  Overall back pain seems better off statin    PLAN:  Patient Instructions   Weekly vitamin B 12 shots for 4 shots, then monthly    Begin vitamin B 12 pills daily 2000 mcg daily    When you stopped the Omeprazole, your stomach acid got worse.  So take one per day    Stop Equate for sleep.  It can cause daytime sleepiness and memory problems    Its the diphenhydramine that causes the memory problem    You are welcome to take plain acetaminophen or tylenol    If you need a sleeping pill, there are others like Trazodone    Stop plain Lisinopril    Begin Lisinopril/hctz 20/25 one pill each morning.  This will help blood pressure AND SWELLING    When the swelling is back to normal, stop the furosemide and the potassium    Triamcinolone cream to legs every night for the rash and psoriasis    Try prednisone cortisone pill 20 mgs once a day in the morning for one week for the right knee and the back            No orders of the defined types were placed in this encounter.    Medications Discontinued During This Encounter   Medication Reason     diphenhydrAMINE-acetaminophen (TYLENOL PM)  mg Tab      lisinopril (PRINIVIL,ZESTRIL) 20 MG tablet      labetalol (TRANDATE; NORMODYNE) 100 MG tablet      omeprazole (PRILOSEC) 20 MG capsule Reorder     Administrations This Visit     cyanocobalamin injection 1,000 mcg     Admin Date Action Dose Route Administered By             03/01/2018 Given 1000 mcg Intramuscular Sailaja Morfin,  MA                        Return in about 2 weeks (around 3/15/2018).    CHIEF COMPLAINT:  Chief Complaint   Patient presents with     Follow-up       HISTORY OF PRESENT ILLNESS:  Axel is a 85 y.o. male presenting to the clinic today for medication management. He is accompanied by his wife.     Edema: His swelling is going down, and his wife reports that his shoes went on much more easily this morning than they have in quite some time. They both report that the swelling is quite a bit better. They first noticed that the swelling had gone down yesterday. He has been taking furosemide for over one year. His wife helps him with his shoes and keeps track of his swelling.     Hypertension: His blood pressures have been in a normal range. He does not check his BP or HR at home. He is not sure is his fatigue has decreased after discontinuing labetalol.He does not remember why Dr. Gomez placed him on lisinopril. He believes he has taken 3 pills of labetalol daily for years.    GERD: He stopped taking omeprazole because he could not remember what it was, and suffered from acid. He has since restarted the medication. He takes omeprazole by prescription.     Vitamin B12 Deficiency: He states he has had two B12 injections. He did not notice any dramatic effects. He started taking B12 pills two weeks ago.     Atrial Fibrillation:  He continues on a blood thinner and notes no bleeding.    Psoriasis: His wife states he has scabs ans a potential outbreak of psoriasis on his lower legs. She wonders if there is a specific type of lotion she should use. They have only regular body lotion at home    Sleep Apnea: He has been taking equate for sleep. He was given a CPAP, and returned it.     Knee Pain: His wife mentions that his right knee has been bothering him, but that he would not like a shot because he dislikes the pin and needles. He is amenable to using cortisone pills.     REVIEW OF SYSTEMS:   He reports he has not been  coughing much , only occasionally. He reports he does not get sinus infections or bronchitis. He wife believes he has not mentioned as many aches and pains, and has not fallen down.     All other systems are negative.    PFSH:  Medical: He reports no history of stroke.     TOBACCO USE:  History   Smoking Status     Former Smoker   Smokeless Tobacco     Never Used       VITALS:  Vitals:    03/01/18 1155   BP: 122/76   Patient Site: Left Arm   Patient Position: Sitting   Cuff Size: Adult Large   Pulse: 64   Weight: (!) 231 lb 12.8 oz (105.1 kg)     Wt Readings from Last 3 Encounters:   03/01/18 (!) 231 lb 12.8 oz (105.1 kg)   02/15/18 (!) 244 lb 1.6 oz (110.7 kg)   11/08/16 (!) 224 lb 3 oz (101.7 kg)     Body mass index is 34.23 kg/(m^2).    PHYSICAL EXAM:  Constitutional:  Reveals an alert, talkative man in a wheelchair.  Vitals:  Per nursing notes.  Cardiac:  Regular rate and rhythm without murmurs, rubs, or gallops. Carotids without bruits. 1 plus edema of the right and left lower legs. Palpation of the radial pulse regular.  Lungs: Clear.  Respiratory effort normal.  Rheumatologic: Normal joints and nails of the hands.  Neurologic:  Cranial nerves II-XII intact.     Psychiatric:  Mood appropriate, memory intact.     QUALITY MEASURES:    ADDITIONAL HISTORY SUMMARIZED (2): Reviewed note from 11/08/2016, Dr. Gomez switched to lisinopril because blood pressure was too low.   DECISION TO OBTAIN EXTRA INFORMATION (1): None  RADIOLOGY TESTS (1): None  LABS (1): Reviewed labs, INR, Uric acid.   MEDICINE TESTS (1): None  INDEPENDENT REVIEW (2 each): None    Total data points: 3    The visit lasted a total of 28 minutes face to face with the patient. Over 50% of the time was spent counseling and educating the patient about edema.    IKaryna, am scribing for and in the presence of, Dr. Kenny.    I, Dr. Kenny, personally performed the services described in this documentation, as scribed by Karyna Rodriguez in my  presence, and it is both accurate and complete.    MEDICATIONS:  Current Outpatient Prescriptions   Medication Sig Dispense Refill     cholecalciferol, vitamin D3, (VITAMIN D3) 5,000 unit Tab Take 1 tablet (5,000 Units total) by mouth daily. 90 tablet 3     furosemide (LASIX) 40 MG tablet TAKE 1 TABLET (40 MG TOTAL) BY MOUTH DAILY. 90 tablet 3     omeprazole (PRILOSEC) 20 MG capsule Take 1 capsule (20 mg total) by mouth daily before breakfast. 90 capsule 3     potassium chloride (KLOR-CON) 10 MEQ CR tablet TAKE 1 TABLET (10 MEQ TOTAL) BY MOUTH DAILY. 90 tablet 4     warfarin (COUMADIN) 5 MG tablet Take 1/2-1 tablet (2.5mg- 5 mg) daily as directed based on inr results 90 tablet 1     cyanocobalamin, vitamin B-12, 2,000 mcg Tab Take 2,000 mcg by mouth daily. 100 tablet 11     labetalol (NORMODYNE) 200 MG tablet Take 0.5 tablets (100 mg total) by mouth 2 (two) times a day. 270 tablet 3     lisinopril-hydrochlorothiazide (PRINZIDE,ZESTORETIC) 20-25 mg per tablet Take 1 tablet by mouth daily. 90 tablet 3     predniSONE (DELTASONE) 20 MG tablet Take 1 tablet (20 mg total) by mouth daily for 7 days. 7 tablet 0     triamcinolone (KENALOG) 0.1 % cream Apply to legs each night 85.2 g 11     Current Facility-Administered Medications   Medication Dose Route Frequency Provider Last Rate Last Dose     cyanocobalamin injection 1,000 mcg  1,000 mcg Intramuscular Q7 Days Danis Kenny MD   1,000 mcg at 03/01/18 1200

## 2021-06-16 NOTE — PROGRESS NOTES
ASSESSMENT:  1. Chronic midline low back pain, with sciatica presence unspecified  Consider statin side effect.  He reports he has not taken any narcotics for 6 months but requests a refill.  This is denied and we discussed current narcotic guidelines.  Will evaluate and consider treatment with alternative agents including prednisone    2. Essential hypertension with goal blood pressure less than 140/90  Stable.  Question side effects of fatigue on beta-blocker.  Update labs.  I believe hydrochlorothiazide was discontinued last year.  Decrease intensity of beta-blocker  - Comprehensive Metabolic Panel  - HM2(CBC w/o Differential)    3. Myalgia  Trial off statin.  Lab evaluation for underlying causes  - Thyroid Stimulating Hormone (TSH)  - Vitamin B12  - Vitamin D, Total (25-Hydroxy)  - Uric Acid  - Erythrocyte Sedimentation Rate    4. Personal history of PE (pulmonary embolism)  Reviewed hospitalization 2015 for DVT and pulmonary embolism.  On chronic warfarin since  - INR    5. Generalized edema  Persisting for at least 2 years.  Suspect at least some component of right leg is lymphedema after DVT 2015.  Trial of decreased beta-blocker.  Continue low-dose diuretic.  He is already seen lymphedema clinic.  Metabolic evaluation for anemia, thyroid, etc.    6. Essential hypertension  Trial of lower dose labetalol.  No cough  - labetalol (TRANDATE; NORMODYNE) 100 MG tablet; Take 1 tablet (100 mg total) by mouth 2 (two) times a day.  Dispense: 180 tablet; Refill: 3    7. Lymphedema of right lower extremity  As above.  Reviewed history of right leg DVT    8. Long term current use of anticoagulant therapy  - INR    9. Warfarin anticoagulation  - INR    10.  Dementia.  With fatigue.  Update labs.  One previous vitamin B12 level was low.  No narcotics.    PLAN:  Patient Instructions   We do not like narcotics anymore for chronic pain    Stop the Simvastatin for 1-2 months.  See if any of your back and leg pain are related to  the side effect of Simvastatin    No Vicodin/hydrocodone for pain    Update blood tests    Check reasons for memory and fatigue:  Thyroid, vitamin B 12, anemia, diabetes    Another reason for symptoms is medication side effects    You are not taking Omeprazole for stomach acid    Decrease Labetalol to one half pill twice a day.  Currently you are taking 3 pills a day.  See if your energy or strength or swelling gets better    Your swelling in the right leg is probably Lymphedema from the blood clot a few years ago, maybe some prostate surgery      Orders Placed This Encounter   Procedures     Comprehensive Metabolic Panel     HM2(CBC w/o Differential)     Thyroid Stimulating Hormone (TSH)     Vitamin B12     Vitamin D, Total (25-Hydroxy)     Uric Acid     Erythrocyte Sedimentation Rate     Medications Discontinued During This Encounter   Medication Reason     omeprazole (PRILOSEC) 20 MG capsule Therapy completed     methyl salicylate-menthol (METHYL SALICYLATE-MENTHOL) Oint Therapy completed     HYDROcodone-acetaminophen 5-325 mg per tablet Therapy completed     lidocaine 2 % jelly (XYLOCAINE)      simvastatin (ZOCOR) 80 MG tablet Side effects     labetalol (NORMODYNE) 200 MG tablet Reorder       No Follow-up on file.    CHIEF COMPLAINT:  Chief Complaint   Patient presents with     [facility]     water retention      Psoriasis     check up       HISTORY OF PRESENT ILLNESS:  Axel is a 85 y.o. male presenting to the clinic today to address his edema.     Edema: Both of his legs are quite swollen, though his right leg is more swollen. His fluid retention has persisted for years. Wraps and pills did not work for him. He is not taking any new medications. Furosemide results in frequent urination. His wife states that the wraps placed onto his legs caused swelling. He does not use ointment on his legs. He does not cough regularly.     He has not taken vicodin lately because it was not filled. He has pain in his back  and neck. He states he has not had it for about 6 months.     DVT: He has been on a blood thinner since his fist blood clot in 2015. He is taking warfarin as a blood thinner. He will need an INR. He has ultrasounds of his legs a few years ago, which showed a clot in the right leg, and cysts at the knee.     Hypertension: carveidilol for blood pressure once in the morning and once at night for a long time. He takes lisinopril for blood pressure as well.     Psoriasis: He has untreated psoriasis.     Memory: He states e does not have any abnormal memory problems. His wife has noticed some memory issues in the past year.  He states he is 85 years old. He has issue with his speech. He has difficulty repeating some of the things his wife says.     Fatigue: He feel fatigues all the time. He has never had a b12 shot. He states he sleeps well.     REVIEW OF SYSTEMS:   He saw Dr. Gomez for a number of years. He states no history of gout or toe pain.  He has an electric scooter, a walker, and a cane. He smoked 30 years ago.     All other systems are negative.    PFSH:  Pertinent history reviewed.   History   Smoking Status     Former Smoker   Smokeless Tobacco     Never Used       Family History   Problem Relation Age of Onset     Stroke Mother      No Medical Problems Father        Social History     Social History     Marital status:      Spouse name: N/A     Number of children: N/A     Years of education: N/A     Occupational History     Not on file.     Social History Main Topics     Smoking status: Former Smoker     Smokeless tobacco: Never Used     Alcohol use No     Drug use: No     Sexual activity: Not on file     Other Topics Concern     Not on file     Social History Narrative       Past Surgical History:   Procedure Laterality Date     [facility] CRYOSURG ABLATION OF PROSTATE      Description: Surgery Prostate Cryosurgical Ablation;  Proc Date: 12/01/2009;     [facility] INSERT LENS PROSTHESIS ONLY      Description: Insert  Intraocular Prosthesis; Cataract Prev. Removed Right;  Proc Date: 07/05/2007;     TX REMOVE TONSILS/ADENOIDS,<13 Y/O      Description: Tonsillectomy With Adenoidectomy;  Recorded: 07/21/2008;     [place] REVISE MEDIAN N/CARPAL TUNNEL SURG      Description: Neuroplasty Decompression Median Nerve At Carpal Tunnel;  Recorded: 07/21/2008;       No Known Allergies    Active Ambulatory Problems     Diagnosis Date Noted     Myalgia      Hearing Loss      Mixed Hyperlipoproteinemia      Impaired fasting glucose      Exogenous Obesity      Osteoarthritis      Anemia      Essential Hypertension      Coronary Artery Disease      Sleep Apnea      Mitral Regurgitation      Memory Lapses Or Loss      Renal artery stenosis 04/03/2015     Long term (current) use of anticoagulants 11/24/2015     Adrenal adenoma- bilateral 03/04/2016     Back pain, chronic 11/08/2016     Chronic pain syndrome 11/08/2016     Warfarin anticoagulation 11/22/2016     Personal history of PE (pulmonary embolism) 07/24/2017     Lymphedema of right lower extremity 02/15/2018     Resolved Ambulatory Problems     Diagnosis Date Noted     Adenocarcinoma Of The Prostate Gland      Cerumen Impaction In The Right Ear      Allergic rhinitis      Lower Back Pain      Ataxia      Cataract      Benign Polyps Of The Large Intestine      Male Erectile Disorder      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      Pulmonary emboli 11/16/2015     Acute DVT (deep venous thrombosis), right 11/17/2015     Past Medical History:   Diagnosis Date     Acute DVT (deep venous thrombosis), right 11/17/2015     Adenocarcinoma of prostate      Adenocarcinoma Of The Prostate Gland      Allergic rhinitis      Anemia      Ataxia      Benign Polyps Of The Large Intestine      CAD (coronary artery disease)      Cataract      HTN (hypertension)      OA (osteoarthritis)      PE (pulmonary thromboembolism)      Pulmonary emboli 11/16/2015     Sleep apnea        VITALS:  Vitals:    02/15/18 1140    BP: 130/80   Patient Site: Left Arm   Patient Position: Sitting   Cuff Size: Adult Large   Pulse: 76   Weight: (!) 244 lb 1.6 oz (110.7 kg)     Wt Readings from Last 3 Encounters:   02/15/18 (!) 244 lb 1.6 oz (110.7 kg)   11/08/16 (!) 224 lb 3 oz (101.7 kg)   10/03/16 222 lb (100.7 kg)     Body mass index is 36.05 kg/(m^2).    PHYSICAL EXAM:  Constitutional:  Reveals an alert, elderly man.  Vitals:  Per nursing notes.  Cardiac:  Irregular rate and rhythm without murmurs, rubs, or gallops. Carotids without bruits. Palpation of the radial pulse regular. 1-2 plus edema on the left, 2-3 plus on the right, excoriations on the left lower leg.  Lungs: Clear.  Respiratory effort normal.  Skin:   Without rash, bruise, or palpable lesions.  Neurologic:  Cranial nerves II-XII intact.     Psychiatric:  Mood appropriate, memory intact.     QUALITY MEASURES:    ADDITIONAL HISTORY SUMMARIZED (2): Reviewed notes from PCP Dr. Gomez, 02/06/2017, edema.   DECISION TO OBTAIN EXTRA INFORMATION (1): None  RADIOLOGY TESTS (1): Reviewed US venous, legs, showed clots.   LABS (1): Reviewed, ordered labs, uric acid, INR  MEDICINE TESTS (1): Reviewed echo from 11/16/2017  INDEPENDENT REVIEW (2 each): None    Total data points: 5    The visit lasted a total of 29 minutes face to face with the patient. Over 50% of the time was spent counseling and educating the patient about edema.    IKaryna, am scribing for and in the presence of, Dr. Kenny.    IDr. Kenny, personally performed the services described in this documentation, as scribed by Karyna Rodriguez in my presence, and it is both accurate and complete.    MEDICATIONS:  Current Outpatient Prescriptions   Medication Sig Dispense Refill     furosemide (LASIX) 40 MG tablet TAKE 1 TABLET (40 MG TOTAL) BY MOUTH DAILY. 90 tablet 3     labetalol (TRANDATE; NORMODYNE) 100 MG tablet Take 1 tablet (100 mg total) by mouth 2 (two) times a day. 180 tablet 3     lisinopril  (PRINIVIL,ZESTRIL) 20 MG tablet Take 1 tablet (20 mg total) by mouth daily. 90 tablet 0     potassium chloride (KLOR-CON 10) 10 MEQ CR tablet Take 1 tablet (10 mEq total) by mouth daily. 90 tablet 0     warfarin (COUMADIN) 5 MG tablet Take 1/2-1 tablet (2.5mg- 5 mg) daily as directed based on inr results 90 tablet 1     No current facility-administered medications for this visit.

## 2021-06-16 NOTE — TELEPHONE ENCOUNTER
Telephone Encounter by Kay Medina LPN at 1/2/2019  9:20 AM     Author: Kay Medina LPN Service: -- Author Type: Licensed Nurse    Filed: 1/2/2019  9:26 AM Encounter Date: 12/31/2018 Status: Signed    : Kay Medina LPN (Licensed Nurse)       Medication: dextroamphetamine-amphetamine 10 mg XR  Last Date Filled 12/10/18 per , chart shows a printed script from ER Doctor on 12/21/18   pulled: YES       Only PCP Prescribing? : NO  Taken as prescribed from physician notes? NO Per OV 11/5/18 with Dr. Roca:    Here for refill on a medication.     Has been out of one of his medications - they think it is the adderall.      Has not been taking adderall.  Has not noticed any help with his energy while being on this medication.  Has not been taking adderall for a few weeks and has not noticed any change in his energy since being off of it.  Psoriasis has been worsening over the past few weeks.  Has continued methotrexate 10mg every week.      CSA in last year: NO  Random Utox in last year: NO  (if any of the above answer NO - schedule with PCP)     Opioids + benzodiazepines? NO  (if the above answer YES - schedule with PCP every 6 months)     All responses suggest: Routing to PCP to advise

## 2021-06-17 NOTE — PROGRESS NOTES
ASSESSMENT:  1. Essential hypertension  Stable on low-dose metoprolol.  Consider stopping metoprolol altogether.  Edema persists.  Continue HCTZ in the morning but change furosemide to evening  - furosemide (LASIX) 40 MG tablet; Take 1 tablet (40 mg total) by mouth daily before supper.  Dispense: 90 tablet; Refill: 3    2. Personal history of PE (pulmonary embolism)  - INR    3. Chronic midline low back pain, with sciatica presence unspecified  Markedly improved.  Dramatic response ×2 on prednisone certainly suggests component of statin induced myopathy or polymyalgia rheumatica    4. Generalized edema  Component of lymphedema of right leg but some swelling in both legs.  Consider increase in hydrochlorothiazide.  Consider complete cessation of metoprolol    5. Lymphedema of right lower extremity  With previous history of DVT.  Encourage support stockings    6. Long term current use of anticoagulant therapy  - INR    7. Warfarin anticoagulation  - INR    8.  Pernicious anemia.  B12 shot today then monthly    PLAN:  Patient Instructions   Vitamin B 12 shot today, then once a month when you come in for INR    Change the Furosemide to evening with the Warfarin.  See if getting rid of more fluid in the evening makes the night time better    Try some simple elastic support stockings    Come back in one month for shot, INR, other blood work      No orders of the defined types were placed in this encounter.    Medications Discontinued During This Encounter   Medication Reason     triamcinolone (KENALOG) 0.1 % cream Reorder     furosemide (LASIX) 40 MG tablet Reorder     Administrations This Visit     cyanocobalamin injection 1,000 mcg     Admin Date Action Dose Route Administered By             04/05/2018 Given 1000 mcg Intramuscular Sailaja Morfin MA                        No Follow-up on file.    CHIEF COMPLAINT:  Chief Complaint   Patient presents with     Follow-up     2 week follow up, feet are still swollen         HISTORY OF PRESENT ILLNESS:  Axel is a 85 y.o. male presenting to the clinic today for medication management.     Hypertension: Has not measured his blood pressure because he and his wife donate their meter to someone in their building who did nto have one.     Edema: His wife reports the right leg started swelling first, and that the left leg has never been as bad as the left leg. He has a few blood clots in the right leg. He has not been coughing. Compression stockings do not help him much. His wife wonders if he should get compression socks. When at home, he goes barefoot. His wife reports that his legs have been swollen forever.     Frequent Urination: He urinates about 4 times per night. He does not believe a water pill increases his urination much at night.     REVIEW OF SYSTEMS:   All other systems are negative.  Memory improved    PFSH:  Pertinent history reviewed.  Lives at home with wife.  Much more active now visiting neighbors in his wheelchair    TOBACCO USE:  History   Smoking Status     Former Smoker   Smokeless Tobacco     Never Used       VITALS:  Vitals:    04/05/18 1052   BP: 124/78   Patient Site: Left Arm   Patient Position: Sitting   Cuff Size: Adult Large   Pulse: 74     Wt Readings from Last 3 Encounters:   03/22/18 (!) 238 lb 11.2 oz (108.3 kg)   03/01/18 (!) 231 lb 12.8 oz (105.1 kg)   02/15/18 (!) 244 lb 1.6 oz (110.7 kg)     There is no height or weight on file to calculate BMI.    PHYSICAL EXAM:  Constitutional:  Reveals an alert, talkative man in a wheelchair.  Vitals:  Per nursing notes.  Cardiac:  Regular rate and rhythm without murmurs, rubs, or gallops. Carotids without bruits. 1 plus edema left leg, 2 plus edema right leg. Palpation of the radial pulse regular.  Lungs: Clear.  Respiratory effort normal.  Skin:   Without rash, bruise, or palpable lesions.  Neurologic:  Cranial nerves II-XII intact.     Psychiatric:  Mood appropriate, memory intact.     QUALITY  MEASURES:    ADDITIONAL HISTORY SUMMARIZED (2): Wife thinks he is doing much better  DECISION TO OBTAIN EXTRA INFORMATION (1): None  RADIOLOGY TESTS (1): None  LABS (1): Reviewed labs, INR  MEDICINE TESTS (1): None  INDEPENDENT REVIEW (2 each): None    Total data points: 3    The visit lasted a total of 33 minutes face to face with the patient. Over 50% of the time was spent counseling and educating the patient about edema.    I, Karyna Rodriguez, am scribing for and in the presence of, Dr. Kenny.    I, Dr. Kenny, personally performed the services described in this documentation, as scribed by Karyna Rodriguez in my presence, and it is both accurate and complete.    MEDICATIONS:  Current Outpatient Prescriptions   Medication Sig Dispense Refill     cholecalciferol, vitamin D3, (VITAMIN D3) 5,000 unit Tab Take 1 tablet (5,000 Units total) by mouth daily. 90 tablet 3     cyanocobalamin, vitamin B-12, 2,000 mcg Tab Take 2,000 mcg by mouth daily. 100 tablet 11     furosemide (LASIX) 40 MG tablet Take 1 tablet (40 mg total) by mouth daily before supper. 90 tablet 3     lisinopril-hydrochlorothiazide (PRINZIDE,ZESTORETIC) 20-25 mg per tablet Take 1 tablet by mouth daily. 90 tablet 3     metoprolol succinate (TOPROL-XL) 25 MG Take 1 tablet (25 mg total) by mouth daily. 30 tablet 11     omeprazole (PRILOSEC) 20 MG capsule Take 1 capsule (20 mg total) by mouth daily before breakfast. 90 capsule 3     potassium chloride (KLOR-CON) 10 MEQ CR tablet TAKE 1 TABLET (10 MEQ TOTAL) BY MOUTH DAILY. 90 tablet 4     predniSONE (DELTASONE) 10 mg tablet Take 1 tablet (10 mg total) by mouth daily. 30 tablet 2     triamcinolone (KENALOG) 0.1 % cream Apply to legs each night 85.2 g 11     warfarin (COUMADIN) 5 MG tablet Take 1/2-1 tablet (2.5mg- 5 mg) daily as directed based on inr results 90 tablet 1     No current facility-administered medications for this visit.

## 2021-06-17 NOTE — PROGRESS NOTES
ASSESSMENT:  1. Essential hypertension  Stable.  Recheck labs now on thiazide diuretic and furosemide  - Basic Metabolic Panel  - HM2(CBC w/o Differential)    2. Vitamin D deficiency  Recheck now on supplements  - Vitamin D, Total (25-Hydroxy)    3. Lymphedema of right lower extremity  Continue to adjust diuretic.  Trial off metoprolol for improved cardiac output    4. Pernicious anemia  Recommend monthly B12  - cyanocobalamin injection 1,000 mcg; Inject 1 mL (1,000 mcg total) into the shoulder, thigh, or buttocks every 30 (thirty) days.    5. Long term current use of anticoagulant therapy  - INR    6. Warfarin anticoagulation  - INR    7. Personal history of PE (pulmonary embolism)  - INR    8.  Hypertension.  With continuing edema.  Trial without metoprolol..  Echocardiogram shows ejection fraction 60% and lightening up on his medication load may improve symptoms    9.  Polymyalgia rheumatica.  Pain in back and legs markedly improved off statin, and with low-dose prednisone.  At next visit we will try to taper prednisone a bit    PLAN:  Patient Instructions   Monthly INR    Monthly B 12 shots    Extra blood tests today    Elevate your legs when you can    Stop Metoprolol completely.  It lowers pulse and blood pressure, in some people too much.   We will give your heart a chance          Orders Placed This Encounter   Procedures     Basic Metabolic Panel     HM2(CBC w/o Differential)     Vitamin D, Total (25-Hydroxy)     Medications Discontinued During This Encounter   Medication Reason     metoprolol succinate (TOPROL-XL) 25 MG      Administrations This Visit     cyanocobalamin injection 1,000 mcg     Admin Date Action Dose Route Administered By             05/03/2018 Given 1000 mcg Intramuscular Leida Lowry CMA                        Return in about 4 weeks (around 5/31/2018).    CHIEF COMPLAINT:  Chief Complaint   Patient presents with     Follow-up     swollen ankles and feet       HISTORY OF PRESENT  ILLNESS:  Axel is a 85 y.o. male presenting to the clinic today to address his edema.     Edema: He was keeping his feet elevated during that day and at night. One of his feet and ankles looked almost normal, and swelling in the other leg was quite reduced. After having his legs elevated for some time, he stated this caused back pain. His wife wonders if he could have a special pillow to help his back so that he can keep his feet elevated.     Frequent Urination: He started taking furosemide again during the day again because frequent urination keeps him up all night. He urinates more frequently when he takes furosemide. After going to the bathroom, he feels as though his temperature goes down.     Osteoarthritis: His pain has been fine overall. He goes out each day and wanders in his electric wheelchair. He had not done this before because he was in too much pain. He feels better, and therefore thinks B12 shots and other medications are working well for him.     REVIEW OF SYSTEMS:   All other systems are negative.    PFSH:  Pertinent history reviewed.     TOBACCO USE:  History   Smoking Status     Former Smoker   Smokeless Tobacco     Never Used       VITALS:  Vitals:    05/03/18 1019   BP: 130/62   Patient Site: Right Arm   Patient Position: Sitting   Cuff Size: Adult Regular   Pulse: 60   Resp: 20   Weight: (!) 231 lb (104.8 kg)     Wt Readings from Last 3 Encounters:   05/03/18 (!) 231 lb (104.8 kg)   03/22/18 (!) 238 lb 11.2 oz (108.3 kg)   03/01/18 (!) 231 lb 12.8 oz (105.1 kg)     Body mass index is 34.11 kg/(m^2).    PHYSICAL EXAM:  Constitutional:  Reveals an alert man who presents in a wheelchair.  Vitals:  Per nursing notes.  Cardiac:  Regular rate and rhythm without murmurs, rubs, or gallops. Carotids without bruits. 1 pus to 2 plus edema of the lower extremities bilaterally. Palpation of the radial pulse regular.  Lungs: Clear.  Respiratory effort normal.  Skin:   Without rash, bruise, or palpable  lesions.  Neurologic:  Cranial nerves II-XII intact.     Psychiatric:  Mood appropriate, memory intact.     QUALITY MEASURES:    ADDITIONAL HISTORY SUMMARIZED (2): None  DECISION TO OBTAIN EXTRA INFORMATION (1): None  RADIOLOGY TESTS (1): None  LABS (1): Reviewed, ordered labs, INR, HM2  MEDICINE TESTS (1): None  INDEPENDENT REVIEW (2 each): None    Total data points: 1     The visit lasted a total of 17 minutes face to face with the patient. Over 50% of the time was spent counseling and educating the patient about edema.    IKaryna, am scribing for and in the presence of, Dr. Kenny.    Dr. Zoya SINGH, personally performed the services described in this documentation, as scribed by Karyna Rodriguez in my presence, and it is both accurate and complete.    MEDICATIONS:  Current Outpatient Prescriptions   Medication Sig Dispense Refill     cholecalciferol, vitamin D3, (VITAMIN D3) 5,000 unit Tab Take 1 tablet (5,000 Units total) by mouth daily. 90 tablet 3     cyanocobalamin, vitamin B-12, 2,000 mcg Tab Take 2,000 mcg by mouth daily. 100 tablet 11     furosemide (LASIX) 40 MG tablet Take 1 tablet (40 mg total) by mouth daily before supper. 90 tablet 3     lisinopril-hydrochlorothiazide (PRINZIDE,ZESTORETIC) 20-25 mg per tablet Take 1 tablet by mouth daily. 90 tablet 3     omeprazole (PRILOSEC) 20 MG capsule Take 1 capsule (20 mg total) by mouth daily before breakfast. 90 capsule 3     potassium chloride (KLOR-CON) 10 MEQ CR tablet TAKE 1 TABLET (10 MEQ TOTAL) BY MOUTH DAILY. 90 tablet 4     predniSONE (DELTASONE) 10 mg tablet Take 1 tablet (10 mg total) by mouth daily. 30 tablet 2     triamcinolone (KENALOG) 0.1 % cream Apply to legs each night 85.2 g 11     warfarin (COUMADIN) 5 MG tablet Take 1/2-1 tablet (2.5mg- 5 mg) daily as directed based on inr results 90 tablet 1     Current Facility-Administered Medications   Medication Dose Route Frequency Provider Last Rate Last Dose     cyanocobalamin injection  1,000 mcg  1,000 mcg Intramuscular Q30 Days Danis Kenny MD   1,000 mcg at 05/03/18 1041

## 2021-06-18 NOTE — LETTER
Letter by Danis Kenny MD at      Author: Danis Kenny MD Service: -- Author Type: --    Filed:  Encounter Date: 1/4/2019 Status: (Other)       Axel Gonzales  [address] [address]             January 4, 2019         Dear Mr. Gonzales,    Below are the results from your recent visit:    Resulted Orders   HM2(CBC w/o Differential)   Result Value Ref Range    WBC 9.0 4.0 - 11.0 thou/uL    RBC 3.23 (L) 4.40 - 6.20 mill/uL    Hemoglobin 11.2 (L) 14.0 - 18.0 g/dL    Hematocrit 33.1 (L) 40.0 - 54.0 %     (H) 80 - 100 fL    MCH 34.6 (H) 27.0 - 34.0 pg    MCHC 33.8 32.0 - 36.0 g/dL    RDW 11.9 11.0 - 14.5 %    Platelets 346 140 - 440 thou/uL    MPV 6.9 (L) 7.0 - 10.0 fL   Comprehensive Metabolic Panel   Result Value Ref Range    Sodium 145 136 - 145 mmol/L    Potassium 4.5 3.5 - 5.0 mmol/L    Chloride 101 98 - 107 mmol/L    CO2 29 22 - 31 mmol/L    Anion Gap, Calculation 15 5 - 18 mmol/L    Glucose 110 70 - 125 mg/dL    BUN 43 (H) 8 - 28 mg/dL    Creatinine 2.20 (H) 0.70 - 1.30 mg/dL    GFR MDRD Af Amer 35 (L) >60 mL/min/1.73m2    GFR MDRD Non Af Amer 29 (L) >60 mL/min/1.73m2    Bilirubin, Total 0.4 0.0 - 1.0 mg/dL    Calcium 10.3 8.5 - 10.5 mg/dL    Protein, Total 6.4 6.0 - 8.0 g/dL    Albumin 3.3 (L) 3.5 - 5.0 g/dL    Alkaline Phosphatase 52 45 - 120 U/L    AST 11 0 - 40 U/L    ALT 16 0 - 45 U/L    Narrative    Fasting Glucose reference range is 70-99 mg/dL per  American Diabetes Association (ADA) guidelines.       Your kidney tests are too dry.  This should improve after we decreased the furosemide and potassium from twice daily to just once daily.  We will repeat these blood tests next time    Please call with questions or contact us using [facility].    Sincerely,        Electronically signed by Danis Kenny MD

## 2021-06-18 NOTE — LETTER
Letter by Danis Kenny MD at      Author: Danis Kenny MD Service: -- Author Type: --    Filed:  Encounter Date: 3/11/2019 Status: (Other)       Axel Gonzales  [facility] [facility]  [address]  [address]  [facility] [address]                                  March 12, 2019    Patient: Axel Gonzales   MR Number: [id number]   YOB: 1932         Discontinue Lisinopril 5 mg daily.    If you have questions, please do not hesitate to call me.                Sincerely,            Danis Kenny MD

## 2021-06-18 NOTE — PROGRESS NOTES
ASSESSMENT:  1. Essential hypertension  Stable even with cessation of beta-blocker.  Edema slightly improved    2. Lymphedema of right lower extremity  Left leg exam pretty good.  Advised continued use of stockings    3. Warfarin anticoagulation    4. Subconjunctival hemorrhage of right eye  With episode of coughing a few nights ago.  Discussed benign nature of same    5. Fatigue, unspecified type  Consider depression.  Trial of low-dose stimulant  - dextroamphetamine-amphetamine (ADDERALL XR) 10 MG 24 hr capsule; Take 1 capsule (10 mg total) by mouth daily.  Dispense: 30 capsule; Refill: 0    6. Primary osteoarthritis of right knee  Worse off prednisone.  Resume but try lower dose  - predniSONE (DELTASONE) 5 MG tablet; Take 1 tablet (5 mg total) by mouth daily.  Dispense: 30 tablet; Refill: 11    7. Cerumen debris on tympanic membrane of both ears  - Ear wax removal      PLAN:  Patient Instructions   Subconjunctival hematoma    Resume prednisone but take 5 mgs every morning.  A lower dose.  Help pain and help walking    Low dose stimulant, 10 mgs every morning, to help energy and pain and appetite.  Try for a month          Orders Placed This Encounter   Procedures     Ear wax removal     Medications Discontinued During This Encounter   Medication Reason     predniSONE (DELTASONE) 10 mg tablet Reorder     Administrations This Visit     cyanocobalamin injection 1,000 mcg     Admin Date Action Dose Route Administered By             06/19/2018 Given 1000 mcg Intramuscular Sailaja Morfin MA                        Return in about 4 weeks (around 7/17/2018).    CHIEF COMPLAINT:  Chief Complaint   Patient presents with     Follow-up     Eye Problem     Noticed some blood in the eye last Thursday, turned black under eye a day later     Cerumen Impaction       HISTORY OF PRESENT ILLNESS:  Axel is a 85 y.o. male presenting to the clinic today to follow up on his hypertension, edema, and polymyalgia rheumatica. He  is accompanied by his wife.     Hypertension: He was instructed to discontinue metoprolol at his last appointment on 5/3; he did not notice any change in his blood pressure or pulse after doing that. He continues to take lisinopril-HCTZ 20-25 mg daily and furosemide 40 mg daily. His blood pressure is in a good range today in the clinic and has been fine at home as well.     Edema: He is currently taking furosemide 40 mg daily along with 25 mg of hydrochlorothiazide daily. He thinks his swelling improved after discontinuing metoprolol last month. He does not wear his compression stockings all the time, but he tries to keep his feet elevated when he is not wearing them. Overall, his swelling is much improved and his weight is down from previous months. He has had trouble getting shoes on, but he recently got some new wider ones.     Polymyalgia Rheumatica: His pain improved with low-dose daily prednisone. He was taking 10 mg of prednisone daily, but he ran out of it about a week ago. The worst pain has been in his right knee. He did not have pain the entire month that he was on the prednisone, but it returned since he ran out a week ago. The knee pain is not as bad today, and he has not had pain in any other parts of his body lately. He has trouble walking because of his knee pain but thinks he would walk more if he did not have pain.     Subconjunctival Hematoma: He developed redness in his right eye on Thursday. The entire white of his eye turned red, and he has since developed some bruising under the eyes as well. It is looking better today, but he inquires if it is anything to worry about. It does not hurt, and he does not have any trouble with his vision.     Pernicious Anemia: He is due for a vitamin B12 shot today.     REVIEW OF SYSTEMS:   His weight is down quite a bit from February. He has not had much energy lately, but this is not new. He has never tried taking a stimulant medication for more energy. He  wakes up two or three times to go to the bathroom during the night, but he is able to fall right back asleep. He does not cough much. He has been trying to watch his weight. He does not always use a wheelchair when he is at home, but he has an electric wheelchair that he uses to get around outside. He would like to have his ears checked for cerumen. His wife does not think he is depressed. All other systems are negative.    PFSH:  Reviewed, as below.     TOBACCO USE:  History   Smoking Status     Former Smoker   Smokeless Tobacco     Never Used       VITALS:  Vitals:    06/19/18 1021   BP: 122/72   Patient Site: Left Arm   Patient Position: Sitting   Cuff Size: Adult Large   Pulse: 76   Weight: (!) 227 lb 9.6 oz (103.2 kg)     Wt Readings from Last 3 Encounters:   06/19/18 (!) 227 lb 9.6 oz (103.2 kg)   05/03/18 (!) 231 lb (104.8 kg)   03/22/18 (!) 238 lb 11.2 oz (108.3 kg)     Body mass index is 33.61 kg/(m^2).    PHYSICAL EXAM:  Constitutional:  Reveals an alert, pleasant, elderly man who presents in a wheelchair.  Vitals:  Per nursing notes.  Ears: Both ears occluded by cerumen. Wears hearing aids.   Eyes: Subconjunctival hematoma of right eye.   Cardiac:  Regular rate and rhythm without murmurs, rubs, or gallops. Palpation of the radial pulse regular. Left leg trace edema, right 1+. Not wearing shoes.   Psychiatric:  Mood appropriate, memory intact.       MEDICATIONS:  Current Outpatient Prescriptions   Medication Sig Dispense Refill     cholecalciferol, vitamin D3, (VITAMIN D3) 5,000 unit Tab Take 1 tablet (5,000 Units total) by mouth daily. 90 tablet 3     cyanocobalamin, vitamin B-12, 2,000 mcg Tab Take 2,000 mcg by mouth daily. 100 tablet 11     furosemide (LASIX) 40 MG tablet Take 1 tablet (40 mg total) by mouth daily before supper. 90 tablet 3     lisinopril-hydrochlorothiazide (PRINZIDE,ZESTORETIC) 20-25 mg per tablet Take 1 tablet by mouth daily. 90 tablet 3     omeprazole (PRILOSEC) 20 MG capsule Take 1  capsule (20 mg total) by mouth daily before breakfast. 90 capsule 3     potassium chloride (KLOR-CON) 10 MEQ CR tablet TAKE 1 TABLET (10 MEQ TOTAL) BY MOUTH DAILY. 90 tablet 4     predniSONE (DELTASONE) 5 MG tablet Take 1 tablet (5 mg total) by mouth daily. 30 tablet 11     triamcinolone (KENALOG) 0.1 % cream Apply to legs each night 85.2 g 11     warfarin (COUMADIN) 5 MG tablet Take 1/2-1 tablet (2.5mg- 5 mg) daily as directed based on inr results 90 tablet 1     carbamide peroxide (DEBROX) 6.5 % otic solution Administer 5 drops into both ears 2 (two) times a day for 7 days. 18 mL 3     dextroamphetamine-amphetamine (ADDERALL XR) 10 MG 24 hr capsule Take 1 capsule (10 mg total) by mouth daily. 30 capsule 0     Current Facility-Administered Medications   Medication Dose Route Frequency Provider Last Rate Last Dose     cyanocobalamin injection 1,000 mcg  1,000 mcg Intramuscular Q30 Days Danis Kenny MD   1,000 mcg at 06/19/18 1051       ADDITIONAL HISTORY SUMMARIZED (2): Reviewed 2/15/2018 note regarding medications and edema.   DECISION TO OBTAIN EXTRA INFORMATION (1): None.   RADIOLOGY TESTS (1): None.  LABS (1): Labs from 5/3/2018 reviewed.   MEDICINE TESTS (1): None.  INDEPENDENT REVIEW (2 each): None.     The visit lasted a total of 19 minutes face to face with the patient. Over 50% of the time was spent counseling and educating the patient about his edema, hypertension, and pain.    IReilly, am scribing for and in the presence of, Dr. Kenny.    I, Dr. Kenny, personally performed the services described in this documentation, as scribed by Reilly Robert in my presence, and it is both accurate and complete.    Total data points: 3

## 2021-06-18 NOTE — PROGRESS NOTES
Instructed the patient to call the clinic if they have not received their INR results within 24 hours.

## 2021-06-19 NOTE — LETTER
Letter by Danis Kenny MD at      Author: Danis Kenny MD Service: -- Author Type: --    Filed:  Encounter Date: 5/10/2019 Status: (Other)       Patient:  Axel Gonzales  :  1932      May 10, 2019      To Whom It May Concern:      Axel's pain scale while sitting ranges between 4-5 out of 10.  It is very difficult for Axel to stand, and when he does, his pain is an 8 out of 10.     See attached office visit from 3/13/19 for additional information.    If you have further questions, please do not hesitate to contact my office.        Electronically Signed,      Danis Kenny MD

## 2021-06-19 NOTE — LETTER
Letter by Danis Kenny MD at      Author: Danis Kenny MD Service: -- Author Type: --    Filed:  Encounter Date: 3/14/2019 Status: (Other)         Axel Gonzales  [facility]  [address]  [address]  [facility] [address]             March 14, 2019         Dear Mr. Gonzales,    Below are the results from your recent visit:    Resulted Orders   Comprehensive Metabolic Panel   Result Value Ref Range    Sodium 141 136 - 145 mmol/L    Potassium 3.8 3.5 - 5.0 mmol/L    Chloride 102 98 - 107 mmol/L    CO2 22 22 - 31 mmol/L    Anion Gap, Calculation 17 5 - 18 mmol/L    Glucose 106 70 - 125 mg/dL    BUN 11 8 - 28 mg/dL    Creatinine 0.87 0.70 - 1.30 mg/dL    GFR MDRD Af Amer >60 >60 mL/min/1.73m2    GFR MDRD Non Af Amer >60 >60 mL/min/1.73m2    Bilirubin, Total 0.4 0.0 - 1.0 mg/dL    Calcium 9.2 8.5 - 10.5 mg/dL    Protein, Total 6.1 6.0 - 8.0 g/dL    Albumin 3.4 (L) 3.5 - 5.0 g/dL    Alkaline Phosphatase 51 45 - 120 U/L    AST 15 0 - 40 U/L    ALT 13 0 - 45 U/L    Narrative    Fasting Glucose reference range is 70-99 mg/dL per  American Diabetes Association (ADA) guidelines.       Your kidney tests have improved since last time.    Please call with questions or contact us using [facility].    Sincerely,        Electronically signed by Danis Kenny MD

## 2021-06-19 NOTE — LETTER
Letter by Danis Kenny MD at      Author: Danis Kenny MD Service: -- Author Type: --    Filed:  Encounter Date: 5/16/2019 Status: (Other)         Axel Gonzales  [facility]  [address]  [address]  [facility] [address]             May 16, 2019         Dear Mr. Gonzales,    Below are the results from your recent visit:    Resulted Orders   Comprehensive Metabolic Panel   Result Value Ref Range    Sodium 142 136 - 145 mmol/L    Potassium 4.0 3.5 - 5.0 mmol/L    Chloride 103 98 - 107 mmol/L    CO2 25 22 - 31 mmol/L    Anion Gap, Calculation 14 5 - 18 mmol/L    Glucose 96 70 - 125 mg/dL    BUN 17 8 - 28 mg/dL    Creatinine 0.99 0.70 - 1.30 mg/dL    GFR MDRD Af Amer >60 >60 mL/min/1.73m2    GFR MDRD Non Af Amer >60 >60 mL/min/1.73m2    Bilirubin, Total 0.5 0.0 - 1.0 mg/dL    Calcium 10.4 8.5 - 10.5 mg/dL    Protein, Total 6.4 6.0 - 8.0 g/dL    Albumin 3.5 3.5 - 5.0 g/dL    Alkaline Phosphatase 55 45 - 120 U/L    AST 14 0 - 40 U/L    ALT 15 0 - 45 U/L    Narrative    Fasting Glucose reference range is 70-99 mg/dL per  American Diabetes Association (ADA) guidelines.   HM2(CBC w/o Differential)   Result Value Ref Range    WBC 9.1 4.0 - 11.0 thou/uL    RBC 4.39 (L) 4.40 - 6.20 mill/uL    Hemoglobin 14.3 14.0 - 18.0 g/dL    Hematocrit 42.2 40.0 - 54.0 %    MCV 96 80 - 100 fL    MCH 32.6 27.0 - 34.0 pg    MCHC 33.9 32.0 - 36.0 g/dL    RDW 12.0 11.0 - 14.5 %    Platelets 214 140 - 440 thou/uL    MPV 7.0 7.0 - 10.0 fL   Glycosylated Hemoglobin A1C   Result Value Ref Range    Hemoglobin A1c 6.0 3.5 - 6.0 %       Your kidney and liver tests are perfectly normal.  You do not have diabetes but we are monitoring this carefully.  Your hemoglobin has improved nicely with removal of the excess fluid.    I sent a prescription for 1 of the newer blood thinners, Xarelto, for a one-month supply.  We will see if your insurance covers this, or whether the cost outweighs the convenience.  This medication  will replace Coumadin/warfarin if it is not too costly.  This will eliminate your need to come in for monthly INRs    Please call with questions or contact us using [employer].    Sincerely,        Electronically signed by Danis Kenny MD

## 2021-06-19 NOTE — PROGRESS NOTES
ASSESSMENT:  1. Psoriasis  Partially improved after approximately 3 cycles of methotrexate.  Insurance delay.  Push methotrexate slightly.  Recheck labs.  No side effects  - methotrexate 2.5 MG tablet; Take 4 tablets (10 mg total) by mouth once a week.  Dispense: 16 tablet; Refill: 11  - Comprehensive Metabolic Panel  - HM2(CBC w/o Differential)    2. Primary osteoarthritis of right knee  Consider cortisone injection.  Avoid anti-inflammatories.  Push prednisone for short-term  - predniSONE (DELTASONE) 10 mg tablet; Take 1 tablet (10 mg total) by mouth daily.  Dispense: 30 tablet; Refill: 11    3. Fatigue, unspecified type  Trial of higher dose prednisone.  Trial of increased Adderall    4. Venous stasis ulcer of left lower extremity (H)  Reviewed healing sore left ankle.  Wife reports that this never heals completely    5. Adrenal nodule (H)  Caution regarding diuretic, methotrexate, and possibility of hyperkalemia    6. 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  Chronic warfarin.    7.  History of pulmonary embolism.  On chronic warfarin.  Reviewed interaction with methotrexate and pharmacy concerns.    PLAN:  Patient Instructions   Continue methotrexate but increase to 4 pills each week for the psoriasis and the knee    Continue folic acid every day    Increase prednisone to 10 mgs daily for the knee and the psoriasis and energy    Increase adderall to 15 mgs every morning as a stimulant    Update blood tests on the methotrexate          Orders Placed This Encounter   Procedures     Comprehensive Metabolic Panel     HM2(CBC w/o Differential)     Medications Discontinued During This Encounter   Medication Reason     methotrexate 2.5 MG tablet Reorder     dextroamphetamine-amphetamine (ADDERALL XR) 10 MG 24 hr capsule      predniSONE (DELTASONE) 5 MG tablet Reorder       Return in about 4 weeks (around 9/10/2018).    CHIEF COMPLAINT:  Chief Complaint    Patient presents with     Follow-up     multi        HISTORY OF PRESENT ILLNESS:  Axel is a 85 y.o. male presenting to the clinic today for follow up for multiple conditions. He is accompanied by his wife. His last visit was 6/27/18. At that visit, methotrexate 7.5mg weekly was added to prednisone 5mg daily for a new diagnosis of psoriasis. He took the medication for three or four weeks. He and his wife are unsure. He reports that a rash on his forehead is improved in redness and irritation.  Other rashes on his arm are unchanged. He also says methotrexate was very helpful for right knee pain until recently. His knee is sore again. He denies side effect from methotrexate.     Fatigue: He continues on Adderall 10mg, started at his visit on 6/19/18. He is unsure if it is helping. He says he is still fatigued. His wife thought it was initially helpful but agrees that he seems tired and unmotivated now.      History of PE: He continues on warfarin. His wife notes that his INR is increased since starting methotrexate.     REVIEW OF SYSTEMS:   He fell one week ago getting out of the bathtub. He does not think he sustained a fracture in the fall. He does have a bruise.   All other systems are negative.    PFSH:      TOBACCO USE:  History   Smoking Status     Former Smoker   Smokeless Tobacco     Never Used       VITALS:  Vitals:    08/13/18 1339   BP: 122/76   Patient Site: Left Arm   Patient Position: Sitting   Cuff Size: Adult Large   Pulse: 74   Weight: (!) 223 lb 3.2 oz (101.2 kg)     Wt Readings from Last 3 Encounters:   08/13/18 (!) 223 lb 3.2 oz (101.2 kg)   06/19/18 (!) 227 lb 9.6 oz (103.2 kg)   05/03/18 (!) 231 lb (104.8 kg)     Body mass index is 32.96 kg/(m^2).    MEDICATIONS:  Current Outpatient Prescriptions   Medication Sig Dispense Refill     cholecalciferol, vitamin D3, (VITAMIN D3) 5,000 unit Tab Take 1 tablet (5,000 Units total) by mouth daily. 90 tablet 3     cyanocobalamin, vitamin B-12, 2,000 mcg  Tab Take 2,000 mcg by mouth daily. 100 tablet 11     folic acid (FOLVITE) 1 MG tablet Take 2 tablets (2 mg total) by mouth daily. 60 tablet 11     furosemide (LASIX) 40 MG tablet Take 1 tablet (40 mg total) by mouth daily before supper. 90 tablet 3     lisinopril-hydrochlorothiazide (PRINZIDE,ZESTORETIC) 20-25 mg per tablet Take 1 tablet by mouth daily. 90 tablet 3     methotrexate 2.5 MG tablet Take 4 tablets (10 mg total) by mouth once a week. 16 tablet 11     omeprazole (PRILOSEC) 20 MG capsule Take 1 capsule (20 mg total) by mouth daily before breakfast. 90 capsule 3     potassium chloride (KLOR-CON) 10 MEQ CR tablet TAKE 1 TABLET (10 MEQ TOTAL) BY MOUTH DAILY. 90 tablet 4     predniSONE (DELTASONE) 10 mg tablet Take 1 tablet (10 mg total) by mouth daily. 30 tablet 11     triamcinolone (KENALOG) 0.1 % cream Apply to legs each night 85.2 g 11     warfarin (COUMADIN) 5 MG tablet Take 1/2-1 tablet (2.5mg- 5 mg) daily as directed based on inr results 90 tablet 1     dextroamphetamine-amphetamine (ADDERALL XR) 15 MG 24 hr capsule Take 1 capsule (15 mg total) by mouth daily. 30 capsule 0     Current Facility-Administered Medications   Medication Dose Route Frequency Provider Last Rate Last Dose     cyanocobalamin injection 1,000 mcg  1,000 mcg Intramuscular Q30 Days Danis Kenny MD   1,000 mcg at 06/19/18 1051       PHYSICAL EXAM:  Constitutional:  Reveals an alert, talkative man seen in a wheelchair.  Vitals:  Per nursing notes.  Cardiac:  Irregular. 1-2+ edema bilaterally.   Skin:   Echymosis left presacral and flank area.  Small scabbed sore less than 0.5cm left lateral ankle.    Psychiatric:  Mood appropriate, memory intact.       ADDITIONAL HISTORY SUMMARIZED (2): None.   DECISION TO OBTAIN EXTRA INFORMATION (1): None.   RADIOLOGY TESTS (1): None.  LABS (1): Labs ordered.   MEDICINE TESTS (1): None.  INDEPENDENT REVIEW (2 each): None.     The visit lasted a total of 19 minutes face to face with the  patient. Over 50% of the time was spent counseling and educating the patient about methotrexate use.    I, Marco Lawler, am scribing for and in the presence of, Dr. Kenny.    I, Dr Kenny, personally performed the services described in this documentation, as scribed by Marco Lawler in my presence, and it is both accurate and complete.    Total data points: 1

## 2021-06-21 NOTE — PROGRESS NOTES
[facility] Clinic Note    Patient Name: Axel Gonzales  Patient Age: 85 y.o.  YOB: 1932  MRN: [id number]    Date of visit: 11/5/2018    Patient Active Problem List   Diagnosis     Myalgia     Hearing Loss     Mixed Hyperlipoproteinemia     Impaired fasting glucose     Exogenous Obesity     Primary osteoarthritis of knee     Anemia     Essential Hypertension     Coronary Artery Disease     Sleep Apnea     Mitral Regurgitation     Memory Lapses Or Loss     Renal artery stenosis (H)     Long term (current) use of anticoagulants     Adrenal adenoma- bilateral     Back pain, chronic     Chronic pain syndrome     Warfarin anticoagulation     Personal history of PE (pulmonary embolism)     Lymphedema of right lower extremity     Vitamin D deficiency     Pernicious anemia     Psoriasis     Venous stasis ulcer of left lower extremity (H)     Adrenal nodule (H)     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 90 tablet 3     cyanocobalamin, vitamin B-12, 2,000 mcg Tab Take 2,000 mcg by mouth daily. 100 tablet 11     dextroamphetamine-amphetamine (ADDERALL XR) 15 MG 24 hr capsule Take 1 capsule (15 mg total) by mouth daily. 30 capsule 0     folic acid (FOLVITE) 1 MG tablet Take 2 tablets (2 mg total) by mouth daily. 60 tablet 11     furosemide (LASIX) 40 MG tablet Take 1 tablet (40 mg total) by mouth daily before supper. 90 tablet 3     lisinopril-hydrochlorothiazide (PRINZIDE,ZESTORETIC) 20-25 mg per tablet Take 1 tablet by mouth daily. 90 tablet 3     methotrexate 2.5 MG tablet Take 6 tablets (15 mg total) by mouth once a  week. 24 tablet 11     omeprazole (PRILOSEC) 20 MG capsule Take 1 capsule (20 mg total) by mouth daily before breakfast. 90 capsule 3     potassium chloride (KLOR-CON) 10 MEQ CR tablet TAKE 1 TABLET (10 MEQ TOTAL) BY MOUTH DAILY. 90 tablet 4     predniSONE (DELTASONE) 10 mg tablet Take 1 tablet (10 mg total) by mouth daily. 30 tablet 11     triamcinolone (KENALOG) 0.1 % cream Apply to legs each night 85.2 g 11     warfarin (COUMADIN) 5 MG tablet Take 1/2-1 tablet (2.5mg- 5 mg) daily as directed based on inr results 90 tablet 1     [DISCONTINUED] methotrexate 2.5 MG tablet Take 4 tablets (10 mg total) by mouth once a week. 16 tablet 11     [DISCONTINUED] predniSONE (DELTASONE) 10 mg tablet Take 1 tablet (10 mg total) by mouth daily. 30 tablet 11     Facility-Administered Encounter Medications as of 11/5/2018   Medication Dose Route Frequency Provider Last Rate Last Dose     cyanocobalamin injection 1,000 mcg  1,000 mcg Intramuscular Q30 Days Danis Kenny MD   1,000 mcg at 06/19/18 1051       Chief Complaint:   Chief Complaint   Patient presents with     Follow-up     meds, needs INR       /74  Pulse 88  Wt (!) 226 lb 8 oz (102.7 kg)  SpO2 98%  BMI 33.45 kg/m2  HPI:   Here for refill on a medication.    Has been out of one of his medications - they think it is the adderall.     Has not been taking adderall.  Has not noticed any help with his energy while being on this medication.  Has not been taking adderall for a few weeks and has not noticed any change in his energy since being off of it.  Psoriasis has been worsening over the past few weeks.  Has continued methotrexate 10mg every week.      Lesions limited to his forearms and head.   Asymptomatic though.    Has one on his left upper chest that is somewhat pruritic.    ROS: Pertinent ros findings in hpi, all other systems negative.    Objective/Physical Exam:     /74  Pulse 88  Wt (!) 226 lb 8 oz (102.7 kg)  SpO2 98%  BMI 33.45  kg/m2    Gen: NAD, appears age  Skin: warm, dry  HENT: normocephalic atraumatic, MMM  Eyes: non-icteric, no proptosis  CV: NRRR no m/r/g, no peripheral edema  Resp: CTAB no w/r/r, normal respiratory effort  Abd: non-distended, soft  Hematologic: No petechiae or purpura  MSK: no muscle or joint swelling  Neuro: no dysarthria or gross asymmetry  Psych: Cooperative, full affect    Several lesions - some erythematous and several with white crust.  Primarily limited to forearms and hair line and face.   Ranging from few millimeters to largest 2cm left upper chest.  Assessment/Plan:  No results found for this or any previous visit (from the past 24 hour(s)).  Medications Ordered   Medications     methotrexate 2.5 MG tablet     Sig: Take 6 tablets (15 mg total) by mouth once a week.     Dispense:  24 tablet     Refill:  11     predniSONE (DELTASONE) 10 mg tablet     Sig: Take 1 tablet (10 mg total) by mouth daily.     Dispense:  30 tablet     Refill:  11       ICD-10-CM    1. Encounter for long-term (current) use of medications Z79.899 Comprehensive Metabolic Panel     HM2(CBC w/o Differential)   2. Skin lesions L98.9    3. Primary osteoarthritis of right knee M17.11 Amb Referral to Medication Management     predniSONE (DELTASONE) 10 mg tablet   4. Psoriasis L40.9 methotrexate 2.5 MG tablet     Comprehensive Metabolic Panel     HM2(CBC w/o Differential)       Skin lesions not improving on current regimen.  We did discuss dermatology referral, they would prefer not to at this time.  Ddx would include psoriasis as well as actinic keratoses, Dr. Kenny was available so we discussed this as well, I will defer to him regarding further management.  Likewise, since the adderall was ineffective, I discussed this with Dr. Kenny as well as patient.  It may be reasonable to not continue since it has not seemed to help with his fatigue which seems to be long-standing.      Repeating routine lab for methotrexate.    Patient  Instructions   Follow up with Dr. Kenny for next scheduled follow up.      Counseled patient regarding treatments, treatment options, risks and benefits and diagnosis.  The patient was interactive, attentive, verbalized understanding, and we discussed plan.     Jase Roca MD

## 2021-06-22 NOTE — PROGRESS NOTES
[facility]    Facility:   [facility] [[id number]]   Code Status: DNR      CHIEF COMPLAINT/REASON FOR VISIT:  Chief Complaint   Patient presents with     Problem Visit     Urinary symptoms, Fall, PE       HISTORY:      HPI: Axel is a 85 y.o. male male with hearing loss, h/o PE on coumadin,  History of renal artery stenosis, Bilateral adrenal adenomas, MEGHANA not on CPAP, chronic lethargy on adderall xr, psoriasis presented to ED due to a fall.      Hospital Course:   -Fall in bathroom: suspect mechanical. Head contusion on coumadin with CT head x2 negative for ICH.  CT C/T/L spine no acute fractures.  See official report for details. Telemetry no dysrhythmias.   -Acute encephalopathy, unspecified with probable chronic cognitive impairment/memory loss: CT head neg x2.  VBG no hypercarbia or hypoxia.  TSH 0.38, B12 1464. UA neg for infection.  DEMOND on admission and received 4mg IM Morphine so suspect some of the confusion was related to this and delayed clearance of opiate.   However, per spouse had confusion a few days preceding admission which was not his baseline.  EtOH <10.  Initial leukocytosis 15K and normalized quickly.  CXR single view suggested Nodular opacity right perihilar region. Repeat 2V CXR negative.  Procalcitonin 0.06.  Afebrile.  BC's x2 negative.  Per Neurology will not perform additional w/u such as MRI at this time to exclude CVA. Although I'm concerned with his slight dysarthria and some word finding difficulties acute stroke is possible.  But as I explained to family he's already on Coumadin, and was started on Zocor 20mg qhs due to his LDL of 199, but otherwise management would not be different otherwise.  Also, he cannot lie down flat, and has slept in a recliner for years, and they agreed he would not tolerate an MRI.  Appears near baseline at discharge.  PT/OT at TCU.   -Possible aspiration pneumonitis: speech therapy consulted and bedside swallow evaluation  negative for signs/symptoms of aspiration.  Repeat CXR with PA/Lateral negative for any acute infiltrate.   BC's negative.  No hypoxia.  Normal procalcitonin.  No antibiotics continued at discharge.  -H/O PE:  continue warfarin.  Subtherapeutic INR at discharge so dose increased to 3mg daily.  Normally on 2.5mg daily.  Repeat INR on 11/26 with TCU physician to adjust dose accordingly.  -DEMOND: resolved  -HTN:Continue Lisinopril as prescribed and adjust dose accordingly. Hold HCTZ.  Monitor closely at TCU.  -Chronic lethargy: resumed adderall xr after d/w neurology as patient has been on it chronically for fatigue and lethargy per his PCP.  However, decreased dose to 10mg daily.  Ultimately, I would recommend discontinuation of this agent.  -MEGHANA: failed cpap in the past. No hypercarbia on admission. Untreated MEGHANA likely contributor to his chronic Fatigue  -Psoriasis: resumed MTX at discharge. Cont Folic acid  -Chronic BLE edema: on warfarin chronic with therapeutic INR on admission so doubt acute DVT.  BNP 52 so unlikely acute decompensated heart failure.  Lasix at decreased dose of 20mg daily. Lymphedema wraps should continue daily at TCU.  -Chronic OA: has been on chronic prednisone 10mg daily per review of PCP note in 8/2018.   -The patient had a younger catheter placed in the ED on admission.  The patient is to have the younger removed on 11/26 at the TCU and a voiding trial is to be performed per TCU protocol.  He should not have younger continued long-term.  Patient was transferred to TCU for continued rehabilitation.    Today, patient was seen in his room. He c/o dysuria, frequency, incontinence for 4 days, since his catheter came out. He said he can't make it to the bathroom in time now. Per nursing staff, he was up about 15 times overnight. He reports just not feeling well overall and tired from lack of sleep.  He feels he has maybe been a little feverish.  Denies abdominal pain or pressure,  hematuria, and N/V.  He  denies light headedness, dizziness, H/A, chest pain, shortness of breath, palpitations.  He has bilateral leg swelling with the feeling of pressure but no significant pain. He also c/o constipation. He has been drinking prune juice with meals. He does not feel that this is helping much.  He did have a very small BM yesterday.  He reports having a good appetite.    Past Medical History:   Diagnosis Date     Acute DVT (deep venous thrombosis), right 11/17/2015     Replacement Utility updated for latest IMO load     Adenocarcinoma of prostate (H)      Adenocarcinoma Of The Prostate Gland     Created by Conversion      Allergic rhinitis      Anemia      Ataxia     Created by Conversion      Benign Polyps Of The Large Intestine     Created by Conversion      CAD (coronary artery disease)      Cataract      HTN (hypertension)      OA (osteoarthritis)      PE (pulmonary thromboembolism) (H)      Pulmonary emboli (H) 11/16/2015     Sleep apnea              Past Surgical History:   Procedure Laterality Date     MO CRYOSURG ABLATION OF PROSTATE      Description: Surgery Prostate Cryosurgical Ablation;  Proc Date: 12/01/2009;     MO INSERT LENS PROSTHESIS ONLY      Description: Insert Intraocular Prosthesis; Cataract Prev. Removed Right;  Proc Date: 07/05/2007;     MO REMOVE TONSILS/ADENOIDS,<11 Y/O      Description: Tonsillectomy With Adenoidectomy;  Recorded: 07/21/2008;     MO REVISE MEDIAN N/CARPAL TUNNEL SURG      Description: Neuroplasty Decompression Median Nerve At Carpal Tunnel;  Recorded: 07/21/2008;       Family History   Problem Relation Age of Onset     Stroke Mother      No Medical Problems Father      Social History     Socioeconomic History     Marital status:      Spouse name: Not on file     Number of children: Not on file     Years of education: Not on file     Highest education level: Not on file   Social Needs     Financial resource strain: Not on file     Food insecurity - worry: Not on file      Food insecurity - inability: Not on file     Transportation needs - medical: Not on file     Transportation needs - non-medical: Not on file   Occupational History     Not on file   Tobacco Use     Smoking status: Former Smoker     Smokeless tobacco: Never Used   Substance and Sexual Activity     Alcohol use: No     Drug use: No     Sexual activity: Not on file   Other Topics Concern     Not on file   Social History Narrative     Not on file     Current Outpatient Medications on File Prior to Visit   Medication Sig Dispense Refill     acetaminophen (TYLENOL) 325 MG tablet Take 2 tablets (650 mg total) by mouth every 4 (four) hours as needed.  0     cholecalciferol, vitamin D3, (VITAMIN D3) 5,000 unit Tab Take 1 tablet (5,000 Units total) by mouth daily. (Patient taking differently: Take 5,000 Units by mouth daily .      ) 90 tablet 3     dextroamphetamine-amphetamine (ADDERALL XR) 10 MG 24 hr capsule Take 1 capsule (10 mg total) by mouth daily For fatigue. 3 capsule 0     folic acid (FOLVITE) 1 MG tablet Take 2 tablets (2 mg total) by mouth daily. (Patient taking differently: Take 2 mg by mouth daily .      ) 60 tablet 11     furosemide (LASIX) 40 MG tablet Take 0.5 tablets (20 mg total) by mouth daily. 90 tablet 3     lisinopril (PRINIVIL,ZESTRIL) 5 MG tablet Take 1 tablet (5 mg total) by mouth daily.  0     methotrexate 2.5 MG tablet Take 6 tablets (15 mg total) by mouth once a week. (Patient taking differently: Take 15 mg by mouth once a week .      ) 24 tablet 11     omeprazole (PRILOSEC) 20 MG capsule Take 1 capsule (20 mg total) by mouth daily before breakfast. (Patient taking differently: Take 20 mg by mouth daily before breakfast .      ) 90 capsule 3     predniSONE (DELTASONE) 10 mg tablet Take 1 tablet (10 mg total) by mouth daily. (Patient taking differently: Take 10 mg by mouth daily .) 30 tablet 11     simvastatin (ZOCOR) 20 MG tablet Take 1 tablet (20 mg total) by mouth at bedtime.  0     triamcinolone  (KENALOG) 0.1 % cream Apply to legs each night (Patient taking differently: Apply to legs each night.      ) 85.2 g 11     warfarin sodium (WARFARIN ORAL) Take by mouth 11/27/18 INR 1.45  Take 5mg on Tues-Thurs-Sat and 2.5mg all other days.  Next INR 12/3/18.   12/3/18 INR 2.39.  Continue 5mg on Tu,Th,Sa and 2.5mg all other days.  Next INR 12/6/18..             Current Facility-Administered Medications on File Prior to Visit   Medication Dose Route Frequency Provider Last Rate Last Dose     cyanocobalamin injection 1,000 mcg  1,000 mcg Intramuscular Q30 Days Danis Kenny MD   1,000 mcg at 06/19/18 1051     No Known Allergies      Review of Systems   Constitutional: Positive for fatigue. Negative for appetite change and diaphoresis.   HENT: Negative for congestion, rhinorrhea and sore throat.    Respiratory: Positive for shortness of breath. Negative for cough, chest tightness and wheezing.         Chronic shortness of breath which has not worsened and has been stable.   Cardiovascular: Positive for leg swelling. Negative for chest pain and palpitations.   Gastrointestinal: Positive for constipation. Negative for abdominal pain, diarrhea, nausea and vomiting.   Genitourinary: Positive for dysuria, frequency and urgency. Negative for hematuria.        Urinary incontinence.   Musculoskeletal: Positive for arthralgias and back pain.        Chronic back pain   Neurological: Negative for dizziness, light-headedness, numbness and headaches.   Psychiatric/Behavioral: Positive for sleep disturbance.       .  Vitals:    12/03/18 1422   BP: 112/70   Pulse: (!) 110   Resp: 20   SpO2: 96%   Weight: (!) 229 lb 3.2 oz (104 kg)       Physical Exam   Constitutional: He is oriented to person, place, and time. He appears well-developed. He is cooperative.   HENT:   Head: Normocephalic. Hair is abnormal.   Eyes: Conjunctivae and lids are normal. Right eye exhibits no discharge. Left eye exhibits no discharge.   Neck: Normal  range of motion. Neck supple.   Cardiovascular: Normal rate, normal heart sounds and intact distal pulses. Exam reveals no friction rub.   No murmur heard.  Pulmonary/Chest: Effort normal and breath sounds normal. No respiratory distress. He has no wheezes. He has no rales.   Abdominal: Soft. Bowel sounds are decreased. There is no tenderness. There is no CVA tenderness.   Musculoskeletal: He exhibits edema. He exhibits no tenderness.   Bilateral LE 3+ pitting edema.    Neurological: He is alert and oriented to person, place, and time.   Skin: Skin is warm and dry. He is not diaphoretic.   Silvery-white scaly plaques on scalp, neck, ears,  and extremities   Psychiatric: He has a normal mood and affect. His speech is normal. Cognition and memory are normal.         LABS:   Potassium   Date Value Ref Range Status   11/25/2018 3.4 (L) 3.5 - 5.0 mmol/L Final     Lab Results   Component Value Date    INR 2.39 (H) 12/03/2018    INR 1.45 (H) 11/27/2018    INR 1.65 (H) 11/25/2018         ASSESSMENT/PLAN:      ICD-10-CM    1. Warfarin anticoagulation Z79.01 Today's INR was 2.39. Last INR was on 11/27 at 1.45.  Will continue warfarin 2.5 mg M-W-F-Sun and 5 mg T-Th-Sat. Recheck INR on 12/6.   2. Constipation K59.00 Encouraged patient to continue prune juice. Added daily miralax. Will continue to monitor.    3. Bilateral lower extremity edema R60.0 Had a one time dose yesterday of 40 mg Lasix. Weight is unchanged since admission and lungs are clear.  Will continue daily lasix 20 mg and continue to monitor.    4. Hypokalemia E87.6 Upon chart review, K+ was 3.4 on discharge. Low K+ is likely due to diuretic and no K+ supplementation. Ordered 10 mEq PO daily and check BMP on 12/10.    5. Urinary tract infection symptoms R39.9 UA/UC ordered. Will order antibiotics if necessary pending results.    6. Essential hypertension I10 BP within target range. Continue Lisinopril and Lasix.       Patricia SINGH NP Student, am scribing  in the presence of, Lexy Addison CNP  .    I, Lexy Addison CNP , personally performed the services described in this documentation, as scribed by Patricia Mccollum in my presence, and it is both accurate and complete.          Electronically signed by:  Lexy Addison CNP

## 2021-06-22 NOTE — PATIENT INSTRUCTIONS - HE
Stop adderall    Resume monthly vitamin B 12 shots    Stop Simvastatin as we did last feb, since it causes aches and pains    Resume Prednisone low dose 5 mgs each morning    Update blood tests    Decrease Furosemide to once daily    Decrease potassium to once daily

## 2021-06-22 NOTE — PROGRESS NOTES
[facility]    Facility:   [facility] [[id number]]   Code Status: DNR  PCP: Danis Kenny MD   Phone: [phone]   Fax: [phone]      CHIEF COMPLAINT/REASON FOR VISIT:  Chief Complaint   Patient presents with     Discharge Summary       HISTORY COURSE:  Axel is a 85 y.o. male who presented to the hospital due to a fall in his bathroom.  He had a head contusion and he is on Coumadin for pulmonary embolus and a CT scan of the head x2 was negative for intracranial hemorrhage.  He had acute encephalopathy with unspecified probable chronic cognitive impairment and memory loss.  He did have some dysarthria noted by family.  There was a possible aspiration pneumonitis.  He had acute kidney injury which did resolve.  He has underlying medical conditions of hypertension, chronic lethargy for which he is taking Adderall, psoriasis for which he takes methotrexate, and chronic bilateral edema.  He also has osteoarthritis.  During the hospital was noted he had hyponatremia and lactic acidosis.  He does have a history of chronic pain syndrome.  He also has obesity and hypertension and pernicious anemia and vitamin D deficiency.  He does have chronic back pain.  He does have coronary artery disease and impaired fasting glucose.  He has bilateral adrenal adenomas.      During his time at transitional care unit, he developed significant lower extremity edema and blistering of the left lower extremity which resulted in venous stasis ulceration which was superficial.  He also had ulcerations starting on the left buttock area which was about 2 cm in diameter.  Triad paste was started.  He had nystatin started for yeast rash around the groin area and other abdominal folds.  He continued to have significant problems with mobility and it was recommended that he would benefit from a wheelchair upon discharge home.  His most recent weight was 228 pounds.  He does have weakness in general.  He is  mobility related activities of daily living are affected in the home including bathing, food prep, grooming, and dressing.  The wheelchair is suitable and necessary for use in the home.  A cane or walker would not give the stability required for these activities of daily living.  He has not expressed unwillingness to use the wheelchair in the home and he has the physical and cognitive abilities to maneuver the equipment.    Review of Systems    Vitals:    12/12/18 0718   BP: (!) 175/91   Pulse: 100   Resp: 16   Temp: 97.8  F (36.6  C)   SpO2: 93%       Physical Exam   Constitutional: No distress.   Eyes: Right eye exhibits no discharge. Left eye exhibits no discharge.   Neck: No JVD present.   Cardiovascular: Normal heart sounds.   Pulmonary/Chest: Breath sounds normal. No respiratory distress.   Abdominal: Bowel sounds are normal. There is no tenderness.   Musculoskeletal: He exhibits edema.   Edema of legs has improved from previous week.  It still is at least 2+ and there is weeping through the areas of popped blisters which are now ulcerations on the left lower extremity.   Neurological: He is alert.   Skin:   2 cm shallow M ASD of the left buttock area.  Superficial ulcerations of left lower extremity with no surrounding redness warmth or purulent drainage.   Psychiatric: He has a normal mood and affect.   Nursing note and vitals reviewed.      MEDICATION LIST:  Current Outpatient Medications   Medication Sig     acetaminophen (TYLENOL) 325 MG tablet Take 2 tablets (650 mg total) by mouth every 4 (four) hours as needed.     cholecalciferol, vitamin D3, (VITAMIN D3) 5,000 unit Tab Take 1 tablet (5,000 Units total) by mouth daily. (Patient taking differently: Take 5,000 Units by mouth daily .      )     dextroamphetamine-amphetamine (ADDERALL XR) 10 MG 24 hr capsule Take 1 capsule (10 mg total) by mouth daily For fatigue.     ferrous sulfate 325 (65 FE) MG tablet Take 1 tablet by mouth daily with breakfast.      folic acid (FOLVITE) 1 MG tablet Take 2 tablets (2 mg total) by mouth daily. (Patient taking differently: Take 2 mg by mouth daily .      )     furosemide (LASIX) 40 MG tablet Take 0.5 tablets (20 mg total) by mouth daily. (Patient taking differently: Take 60 mg by mouth daily .      )     Lactobacillus rhamnosus GG (CULTURELLE) 10-15 Billion cell capsule Take 1 capsule by mouth daily (for 21 days) .     lisinopril (PRINIVIL,ZESTRIL) 5 MG tablet Take 1 tablet (5 mg total) by mouth daily.     methotrexate 2.5 MG tablet Take 6 tablets (15 mg total) by mouth once a week. (Patient taking differently: Take 15 mg by mouth once a week .      )     omeprazole (PRILOSEC) 20 MG capsule Take 1 capsule (20 mg total) by mouth daily before breakfast. (Patient taking differently: Take 20 mg by mouth daily before breakfast .      )     polyethylene glycol (MIRALAX) 17 gram packet Take 17 g by mouth daily.     potassium chloride (KLOR-CON) 20 mEq packet Take 20 mEq by mouth 2 (two) times a day.     predniSONE (DELTASONE) 10 mg tablet Take 1 tablet (10 mg total) by mouth daily. (Patient taking differently: Take 10 mg by mouth daily .)     senna-docusate (SENNOSIDES-DOCUSATE SODIUM) 8.6-50 mg tablet Take 1 tablet by mouth 2 (two) times a day.     simvastatin (ZOCOR) 20 MG tablet Take 1 tablet (20 mg total) by mouth at bedtime.     traMADol (ULTRAM) 50 mg tablet Take 50 mg by mouth every 4 (four) hours as needed for pain.     triamcinolone (KENALOG) 0.1 % cream Apply to legs each night (Patient taking differently: Apply to legs each night.      )     warfarin sodium (WARFARIN ORAL) Take by mouth 11/27/18 INR 1.45  Take 5mg on Tues-Thurs-Sat and 2.5mg all other days.  Next INR 12/3/18.   12/3/18 INR 2.39.  Continue 5mg on Tu,Th,Sa and 2.5mg all other days.  Next INR 12/6/18.  12/10 INR 2.62. Continue 5mg on Tu,Th,Sa and 2.5mg all other days.  Next INR 12/17/18.             DISCHARGE DIAGNOSIS:    ICD-10-CM    1. Fall, subsequent  encounter W19.XXXD    2. History of pulmonary embolus (PE) Z86.711    3. Bilateral lower extremity edema R60.0    4. Venous stasis ulcer of left lower extremity (H) I83.029     L97.929    5. Warfarin anticoagulation Z79.01        MEDICAL EQUIPMENT NEEDS:  The recommendation is for a wheelchair that has a 20 inch x 18 inch standard size with question the same size.  Also standard foot rest and full length armrests.    DISCHARGE PLAN/FACE TO FACE:  I certify that services are/were furnished while this patient was under the care of a physician and that a physician or an allowed non-physician practitioner (NPP), had a face-to-face encounter that meets the physician face-to-face encounter requirements. The encounter was in whole, or in part, related to the primary reason for home health. The patient is confined to his/her home and needs intermittent skilled nursing, physical therapy, speech-language pathology, or the continued need for occupational therapy. A plan of care has been established by a physician and is periodically reviewed by a physician.  Date of Face-to-Face Encounter: 12/13/18    I certify that, based on my findings, the following services are medically necessary home health services: Home occupational therapy, home physical therapy, social work evaluation, skilled nursing, and home health aide.    My clinical findings support the need for the above skilled services because: (Please write a brief narrative summary that describes what the RN, PT, SLP, or other services will be doing in the home. A list of diagnoses in this section does not meet the CMS requirements.)  The skilled nursing visits are for medication set up and teaching, symptom and disease process monitoring and education and monitoring of the wounds particularly.  Home health aide services are for bathing and ADL needs.  Home physical therapy is to evaluate and treat for strengthening, balance, endurance, and safety with mobility and  ambulation.  Home occupational therapy is to evaluate and treat for strengthening, ADL needs, adaptive equipment and safety.  Social work evaluation is to coordinate services.    This patient is homebound because: (Please write a brief narrative summary describing the functional limitations as to why this patient is homebound and specifically what makes this patient homebound.)  He still has significant weakness and unsteadiness of gait    The patient is, or has been, under my care and I have initiated the establishment of the plan of care. This patient will be followed by a physician who will periodically review the plan of care.    Schedule follow up visit with primary care provider within 7 days to reestablish care.    Electronically signed by: Danilo Ordonez MD

## 2021-06-22 NOTE — PROGRESS NOTES
[facility]    Facility:   [facility] [[id number]]   Code Status: DNR      CHIEF COMPLAINT/REASON FOR VISIT:  Chief Complaint   Patient presents with     Problem Visit     s/p fall, warfarin anticoagulation, UTI, LE edema, hypokalemia       HISTORY:      HPI: Axel is a 85 y.o. male  with hearing loss, h/o PE on coumadin,  History of renal artery stenosis, Bilateral adrenal adenomas, MEGHANA not on CPAP, chronic lethargy on adderall xr, psoriasis presented to ED due to a fall.      Hospital Course:   -Fall in bathroom: suspect mechanical. Head contusion on coumadin with CT head x2 negative for ICH.  CT C/T/L spine no acute fractures.  See official report for details. Telemetry no dysrhythmias.   -Acute encephalopathy, unspecified with probable chronic cognitive impairment/memory loss: CT head neg x2.  VBG no hypercarbia or hypoxia.  TSH 0.38, B12 1464. UA neg for infection.  DEMOND on admission and received 4mg IM Morphine so suspect some of the confusion was related to this and delayed clearance of opiate.   However, per spouse had confusion a few days preceding admission which was not his baseline.  EtOH <10.  Initial leukocytosis 15K and normalized quickly.  CXR single view suggested Nodular opacity right perihilar region. Repeat 2V CXR negative.  Procalcitonin 0.06.  Afebrile.  BC's x2 negative.  Per Neurology will not perform additional w/u such as MRI at this time to exclude CVA. Although I'm concerned with his slight dysarthria and some word finding difficulties acute stroke is possible.  But as I explained to family he's already on Coumadin, and was started on Zocor 20mg qhs due to his LDL of 199, but otherwise management would not be different otherwise.  Also, he cannot lie down flat, and has slept in a recliner for years, and they agreed he would not tolerate an MRI.  Appears near baseline at discharge.  PT/OT at TCU.   -Possible aspiration pneumonitis: speech therapy consulted and  "bedside swallow evaluation negative for signs/symptoms of aspiration.  Repeat CXR with PA/Lateral negative for any acute infiltrate.   BC's negative.  No hypoxia.  Normal procalcitonin.  No antibiotics continued at discharge.  -H/O PE:  continue warfarin.  Subtherapeutic INR at discharge so dose increased to 3mg daily.  Normally on 2.5mg daily.  Repeat INR on 11/26 with TCU physician to adjust dose accordingly.  -DEMOND: resolved  -HTN:Continue Lisinopril as prescribed and adjust dose accordingly. Hold HCTZ.  Monitor closely at TCU.  -Chronic lethargy: resumed adderall xr after d/w neurology as patient has been on it chronically for fatigue and lethargy per his PCP.  However, decreased dose to 10mg daily.  Ultimately, I would recommend discontinuation of this agent.  -MEGHANA: failed cpap in the past. No hypercarbia on admission. Untreated MEGHANA likely contributor to his chronic Fatigue  -Psoriasis: resumed MTX at discharge. Cont Folic acid  -Chronic BLE edema: on warfarin chronic with therapeutic INR on admission so doubt acute DVT.  BNP 52 so unlikely acute decompensated heart failure.  Lasix at decreased dose of 20mg daily. Lymphedema wraps should continue daily at TCU.  -Chronic OA: has been on chronic prednisone 10mg daily per review of PCP note in 8/2018.   -The patient had a younger catheter placed in the ED on admission.  The patient is to have the younger removed on 11/26 at the TCU and a voiding trial is to be performed per TCU protocol.  He should not have younger continued long-term.  Patient was transferred to TCU for continued rehabilitation.    Today, patient was seen in his room. His UTI symptoms have improved substantially since last week.  He is no longer having discomfort. He has no complaints at this time.  He denies light headedness, dizziness, H/A, chest pain, shortness of breath, palpitations. Denies abdominal pain or pressure, and N/V.  His constipation has improved. LBM was yesterday and was \"normal\".  He " has bilateral leg swelling with the feeling of pressure but no significant pain, which he feels has improved. He reports having a good appetite.    Past Medical History:   Diagnosis Date     Acute DVT (deep venous thrombosis), right 11/17/2015     Replacement Utility updated for latest IMO load     Adenocarcinoma of prostate (H)      Adenocarcinoma Of The Prostate Gland     Created by Conversion      Allergic rhinitis      Anemia      Ataxia     Created by Conversion      Benign Polyps Of The Large Intestine     Created by Conversion      CAD (coronary artery disease)      Cataract      HTN (hypertension)      OA (osteoarthritis)      PE (pulmonary thromboembolism) (H)      Pulmonary emboli (H) 11/16/2015     Sleep apnea              Past Surgical History:   Procedure Laterality Date     MD CRYOSURG ABLATION OF PROSTATE      Description: Surgery Prostate Cryosurgical Ablation;  Proc Date: 12/01/2009;     MD INSERT LENS PROSTHESIS ONLY      Description: Insert Intraocular Prosthesis; Cataract Prev. Removed Right;  Proc Date: 07/05/2007;     MD REMOVE TONSILS/ADENOIDS,<13 Y/O      Description: Tonsillectomy With Adenoidectomy;  Recorded: 07/21/2008;     MD REVISE MEDIAN N/CARPAL TUNNEL SURG      Description: Neuroplasty Decompression Median Nerve At Carpal Tunnel;  Recorded: 07/21/2008;       Family History   Problem Relation Age of Onset     Stroke Mother      No Medical Problems Father      Social History     Socioeconomic History     Marital status:      Spouse name: Not on file     Number of children: Not on file     Years of education: Not on file     Highest education level: Not on file   Social Needs     Financial resource strain: Not on file     Food insecurity - worry: Not on file     Food insecurity - inability: Not on file     Transportation needs - medical: Not on file     Transportation needs - non-medical: Not on file   Occupational History     Not on file   Tobacco Use     Smoking status: Former  Smoker     Smokeless tobacco: Never Used   Substance and Sexual Activity     Alcohol use: No     Drug use: No     Sexual activity: Not on file   Other Topics Concern     Not on file   Social History Narrative     Not on file     Current Outpatient Medications on File Prior to Visit   Medication Sig Dispense Refill     acetaminophen (TYLENOL) 325 MG tablet Take 2 tablets (650 mg total) by mouth every 4 (four) hours as needed.  0     cholecalciferol, vitamin D3, (VITAMIN D3) 5,000 unit Tab Take 1 tablet (5,000 Units total) by mouth daily. (Patient taking differently: Take 5,000 Units by mouth daily .      ) 90 tablet 3     dextroamphetamine-amphetamine (ADDERALL XR) 10 MG 24 hr capsule Take 1 capsule (10 mg total) by mouth daily For fatigue. 3 capsule 0     ferrous sulfate 325 (65 FE) MG tablet Take 1 tablet by mouth daily with breakfast.       folic acid (FOLVITE) 1 MG tablet Take 2 tablets (2 mg total) by mouth daily. (Patient taking differently: Take 2 mg by mouth daily .      ) 60 tablet 11     furosemide (LASIX) 40 MG tablet Take 0.5 tablets (20 mg total) by mouth daily. (Patient taking differently: Take 60 mg by mouth daily .      ) 90 tablet 3     Lactobacillus rhamnosus GG (CULTURELLE) 10-15 Billion cell capsule Take 1 capsule by mouth daily (for 21 days) .       lisinopril (PRINIVIL,ZESTRIL) 5 MG tablet Take 1 tablet (5 mg total) by mouth daily.  0     methotrexate 2.5 MG tablet Take 6 tablets (15 mg total) by mouth once a week. (Patient taking differently: Take 15 mg by mouth once a week .      ) 24 tablet 11     omeprazole (PRILOSEC) 20 MG capsule Take 1 capsule (20 mg total) by mouth daily before breakfast. (Patient taking differently: Take 20 mg by mouth daily before breakfast .      ) 90 capsule 3     polyethylene glycol (MIRALAX) 17 gram packet Take 17 g by mouth daily.       potassium chloride (KLOR-CON) 20 mEq packet Take 20 mEq by mouth 2 (two) times a day.       predniSONE (DELTASONE) 10 mg tablet  Take 1 tablet (10 mg total) by mouth daily. (Patient taking differently: Take 10 mg by mouth daily .) 30 tablet 11     senna-docusate (SENNOSIDES-DOCUSATE SODIUM) 8.6-50 mg tablet Take 1 tablet by mouth 2 (two) times a day.       simvastatin (ZOCOR) 20 MG tablet Take 1 tablet (20 mg total) by mouth at bedtime.  0     traMADol (ULTRAM) 50 mg tablet Take 50 mg by mouth every 4 (four) hours as needed for pain.       triamcinolone (KENALOG) 0.1 % cream Apply to legs each night (Patient taking differently: Apply to legs each night.      ) 85.2 g 11     warfarin sodium (WARFARIN ORAL) Take by mouth 11/27/18 INR 1.45  Take 5mg on Tues-Thurs-Sat and 2.5mg all other days.  Next INR 12/3/18.   12/3/18 INR 2.39.  Continue 5mg on Tu,Th,Sa and 2.5mg all other days.  Next INR 12/6/18.  12/10 INR 2.62. Continue 5mg on Tu,Th,Sa and 2.5mg all other days.  Next INR 12/17/18.             [DISCONTINUED] ciprofloxacin HCl (CIPRO) 250 MG tablet Take 250 mg by mouth 2 (two) times a day (for 7 days for UTI) .       Current Facility-Administered Medications on File Prior to Visit   Medication Dose Route Frequency Provider Last Rate Last Dose     cyanocobalamin injection 1,000 mcg  1,000 mcg Intramuscular Q30 Days Danis Kenny MD   1,000 mcg at 06/19/18 1051     No Known Allergies      Review of Systems   Constitutional: Negative for appetite change, diaphoresis, fatigue and fever.   HENT: Negative for congestion, rhinorrhea and sore throat.    Respiratory: Positive for shortness of breath. Negative for cough, chest tightness and wheezing.         Chronic shortness of breath which has not worsened and has been stable.   Cardiovascular: Positive for leg swelling. Negative for chest pain and palpitations.   Gastrointestinal: Negative for abdominal pain, constipation, diarrhea, nausea and vomiting.   Genitourinary: Negative for dysuria, frequency, hematuria and urgency.   Musculoskeletal: Positive for arthralgias and back pain.         Chronic back pain   Neurological: Negative for dizziness, light-headedness, numbness and headaches.   Psychiatric/Behavioral: Negative for sleep disturbance.       .  Vitals:    12/10/18 1025   BP: 125/70   Pulse: 78   Resp: 16   Temp: 98.4  F (36.9  C)   SpO2: 96%   Weight: (!) 228 lb 11.2 oz (103.7 kg)       Physical Exam   Constitutional: He is oriented to person, place, and time. He appears well-developed. He is cooperative.   HENT:   Head: Normocephalic. Hair is abnormal.   Eyes: Conjunctivae and lids are normal. Right eye exhibits no discharge. Left eye exhibits no discharge.   Neck: Normal range of motion. Neck supple.   Cardiovascular: Normal rate, normal heart sounds and intact distal pulses. Exam reveals no friction rub.   No murmur heard.  Pulmonary/Chest: Effort normal and breath sounds normal. No respiratory distress. He has no wheezes. He has no rales.   Abdominal: Soft. Bowel sounds are decreased. There is no tenderness. There is no CVA tenderness.   Musculoskeletal: He exhibits edema. He exhibits no tenderness.   Bilateral LE 3+ pitting edema.    Neurological: He is alert and oriented to person, place, and time.   Skin: Skin is warm and dry. He is not diaphoretic.   Silvery-white scaly plaques on scalp, neck, ears,  and extremities   Psychiatric: He has a normal mood and affect. His speech is normal. Cognition and memory are normal.         LABS:   Results for orders placed or performed in visit on 12/10/18   Basic Metabolic Panel   Result Value Ref Range    Sodium 139 136 - 145 mmol/L    Potassium 4.1 3.5 - 5.0 mmol/L    Chloride 105 98 - 107 mmol/L    CO2 25 22 - 31 mmol/L    Anion Gap, Calculation 9 5 - 18 mmol/L    Glucose 91 70 - 125 mg/dL    Calcium 9.4 8.5 - 10.5 mg/dL    BUN 20 8 - 28 mg/dL    Creatinine 0.89 0.70 - 1.30 mg/dL    GFR MDRD Af Amer >60 >60 mL/min/1.73m2    GFR MDRD Non Af Amer >60 >60 mL/min/1.73m2       Lab Results   Component Value Date    INR 2.62 (H) 12/10/2018    INR 2.33  (H) 12/06/2018    INR 2.39 (H) 12/03/2018     Lab Results   Component Value Date    WBC 9.9 12/10/2018    HGB 10.4 (L) 12/10/2018    HCT 31.8 (L) 12/10/2018     (H) 12/10/2018     12/10/2018         ASSESSMENT/PLAN:      ICD-10-CM    1. Urinary tract infection symptoms R39.9 Improving.  Finished antibiotic. S/sx have resolved.    2. Warfarin anticoagulation Z79.01 Stable. INR 2.62.  Will continue 2.5 mg on M,W,F,Sun and 5 mg on T,Th, and Sat. Recheck INR on 12/17.   3. Bilateral lower extremity edema R60.0 Lasix was increased to 60mg daily on 12/6. Edema may look slightly improved from last week. Weight is unchanged. Continue lasix 60 mg. Will continue to monitor.   4. Constipation K59.00 Improving. Continue with Miralax. Changed senna to Senna-S two times a day because ferrous sulfate was started which can be constipating. Will continue to monitor.   5. Hypokalemia E87.6 Improving. Potassium level is now 4.1.  Will continue Potassium chloride supplement 20 mEq two times a day.    6. Essential hypertension I10 StableBP within target range. Continue Lisinopril and Lasix.   7. DEMOND (acute kidney injury) (H) N17.9 Resolved. DEMOND occurred in hospital. Reassessed status today and BMP is normal. No evidence of DEMOND at this time.   8. Anemia D64.9 Hemoglobin 10.4. Ordered ferrous sulfate 325mg PO. Will continue to monitor.       Patricia SINGH NP Student, am scribing in the presence of, Lexy Addison CNP  .    ILexy CNP , personally performed the services described in this documentation, as scribed by Patricia Mccollum in my presence, and it is both accurate and complete.      .    Electronically signed by: Lexy Addison CNP

## 2021-06-22 NOTE — PROGRESS NOTES
ASSESSMENT:  1. Psoriasis  Resume low-dose prednisone.  Continue methotrexate.  Update lab work.  Rash on back appears to be asteatotic eczema and not flare of psoriasis  - HM2(CBC w/o Differential)  - Comprehensive Metabolic Panel  - predniSONE (DELTASONE) 5 MG tablet; Take 5 mg by mouth daily.  Dispense: 30 tablet; Refill: 11    2. Personal history of PE (pulmonary embolism)  Reviewed INRs.  Continue lifelong anticoagulation    3. Lymphedema of right lower extremity  Volume status excellent.  Reviewed December 21 office note, increase in furosemide, and resumption of hydrochlorothiazide.  Check labs and decrease intensity of furosemide and potassium  - furosemide (LASIX) 40 MG tablet; Take 1 tablet (40 mg total) by mouth daily.  Dispense: 90 tablet; Refill: 3  - potassium chloride (K-DUR,KLOR-CON) 20 MEQ tablet; Take 1 tablet (20 mEq total) by mouth daily.  Dispense: 90 tablet; Refill: 3    4. Adrenal nodule (H)  Discussed increased Aldactone.  Monitor labs but be ready to substitute Spironolactone    5. Venous stasis ulcer of left lower extremity (H)  Healing now with decreased edema    6.  Lethargy.  Resume B12 shots.  Per wife, no improvement when adding 10 or 15 mg Adderall.  Will discontinue Adderall    7.  Disposition.  He is now in assisted living after the hospital and TCU.  Wife is moving into assisted living.  Possible mild stroke and fall.  Reviewed    PLAN:  Patient Instructions   Stop adderall    Resume monthly vitamin B 12 shots    Stop Simvastatin as we did last feb, since it causes aches and pains    Resume Prednisone low dose 5 mgs each morning    Update blood tests    Decrease Furosemide to once daily    Decrease potassium to once daily          Orders Placed This Encounter   Procedures     HM2(CBC w/o Differential)     Comprehensive Metabolic Panel     Medications Discontinued During This Encounter   Medication Reason     dextroamphetamine-amphetamine (ADDERALL XR) 10 MG 24 hr capsule       lisinopril (PRINIVIL,ZESTRIL) 5 MG tablet      simvastatin (ZOCOR) 20 MG tablet      predniSONE (DELTASONE) 10 mg tablet      furosemide (LASIX) 40 MG tablet        No Follow-up on file.    All patient medications were reconciled.     CHIEF COMPLAINT:  Chief Complaint   Patient presents with     Follow-up       HISTORY OF PRESENT ILLNESS:  Axel is a 86 y.o. male presenting to the clinic today for outpatient follow up after inpatient stay for a fall preceded by a stroke. He is accompanied by his wife.     CVA and Fall: He was in hospital after having a fall and hitting his head. After the fall he was very disoriented, could not remember where things in his own place were located like where the bathroom was, so they brought him to the hospital. They determined he had had a stroke that caused the fall, but that there was no intracranial bleeding. From the hospital he went to transitinoal care, and now is in assisted living facility, where he expects to stay for the rest of his life.   Per wife, they are concerned that the medications he is getting are right after being transferred from place to place in the last couple weeks.They were worried in the hospital that he was tired and decided to start giving him adderall again after he had recently stopped.      Psoriasis: His psoriasis is not changed with treatment regimen. They think it is about the same as what his father had.     Aches and pains: His knees and hips are bad, but not more so than before the hospital. Per wife: She thinks much of his aches are from after not going to PT in a good while. They have PT in the assisted living facility that he will start going to. He is on simvastatin.     Bp: His bp was a little low this morning.     Leg edema: His leg edema was bad for quite some time. Per wife it is amazing the size of his legs.They were very large, and they have been wrapping them and now this morning for the first time they are small enough to fit into  "compressive stockings.The swelling was worse before wiliam when he got transferred.     Fatigue: He was on adderall in the past, then taken off it. Then in Hospital was put back on it. After being on and off it a couple times he states he does not notice a difference on it vs off it. He is on Fe for anemia.     Mood: His mood has greatly improved since he is at the assisted living facility now.     Sores: He had some after his fall but they are pretty well healed up now.     Rash: He has a rash across his back and shoulders, and some on his abdomen. The rash itches.      REVIEW OF SYSTEMS:   Denies shortness of breath, pneumonia, mood problems, shooting pains  Admits to itchy rash on stomach and on back.   All other systems are negative.    PFSH:  He expects to be in assissted living for the rest of his life. His wife is in the process of packing and moving into assisted care as an independent so she can see him every day.   Family history of psoriasis in his father.   He will go to PT for exercise at his new assisted care facility.   Reviewed as below.     TOBACCO USE:  Social History     Tobacco Use   Smoking Status Former Smoker   Smokeless Tobacco Never Used       VITALS:  Vitals:    01/02/19 1442   BP: 90/54   Patient Site: Left Arm   Patient Position: Sitting   Cuff Size: Adult Regular   Pulse: (!) 102   Resp: 14   SpO2: 95%   Height: 5' 10\" (1.778 m)     Wt Readings from Last 3 Encounters:   12/21/18 (!) 231 lb (104.8 kg)   12/25/18 (!) 228 lb (103.4 kg)   12/10/18 (!) 228 lb 11.2 oz (103.7 kg)     Body mass index is 32.71 kg/m .    PHYSICAL EXAM:   Constitutional:  Reveals an alert oriented obese elderly man in no acute distress affect appropriate. Vitals:  Per nursing notes.  Cardiac:  Regular rate and rhythm without murmurs, rubs, or gallops. Legs with significant but reduced edema, in compressive stockings. Palpation of the radial pulse regular.  Skin: diffuse lightly raised maculopapular rash more " brown than red on back across shoulders, and around mid epigastric region.  Neurologic: Cranial nerves II-XII, motor strength, sensation, and coordination grossly intact.     Psychiatric:  Mood appropriate, memory intact.     QUALITY MEASURES:  The following high BMI interventions were performed this visit: encouragement to exercise    MEDICATIONS:  Current Outpatient Medications   Medication Sig Dispense Refill     acetaminophen (TYLENOL) 325 MG tablet Take 2 tablets (650 mg total) by mouth every 4 (four) hours as needed.  0     cholecalciferol, vitamin D3, (VITAMIN D3) 5,000 unit Tab Take 1 tablet (5,000 Units total) by mouth daily. (Patient taking differently: Take 5,000 Units by mouth daily .      ) 90 tablet 3     ferrous sulfate 325 (65 FE) MG tablet Take 1 tablet (325 mg total) by mouth daily with breakfast. 30 tablet 0     Lactobacillus rhamnosus GG (CULTURELLE) 10-15 Billion cell capsule Take 1 capsule by mouth daily for 21 days. (for 21 days) 21 capsule 0     methotrexate 15 MG tablet Take 15 mg by mouth once a week.       omeprazole (PRILOSEC) 20 MG capsule Take 1 capsule (20 mg total) by mouth daily before breakfast. 10 capsule 0     polyethylene glycol (MIRALAX) 17 gram packet Take 17 g by mouth daily.       senna-docusate (SENNOSIDES-DOCUSATE SODIUM) 8.6-50 mg tablet Take 1 tablet by mouth 2 (two) times a day.       triamcinolone (KENALOG) 0.1 % cream Apply to legs each night (Patient taking differently: Apply to legs each night.      ) 85.2 g 11     warfarin (COUMADIN/JANTOVEN) 2.5 MG tablet Take 1 tablet daily Mon, Wed, Fri, Sun and 2 tablets daily on Tues, Thurs, Sat 10 tablet 0     furosemide (LASIX) 40 MG tablet Take 1 tablet (40 mg total) by mouth daily. 90 tablet 3     potassium chloride (K-DUR,KLOR-CON) 20 MEQ tablet Take 1 tablet (20 mEq total) by mouth daily. 90 tablet 3     predniSONE (DELTASONE) 5 MG tablet Take 5 mg by mouth daily. 30 tablet 11     Current Facility-Administered Medications    Medication Dose Route Frequency Provider Last Rate Last Dose     cyanocobalamin injection 1,000 mcg  1,000 mcg Intramuscular Q30 Days Danis Kenny MD   1,000 mcg at 06/19/18 1051       ADDITIONAL HISTORY SUMMARIZED (2): Reviewed hospital stay notes showing medical management and recommendations. Additional information obtained from patient's wife throughout visit today.   DECISION TO OBTAIN EXTRA INFORMATION (1): None.   RADIOLOGY TESTS (1): none.   LABS (1): 12/31/18, 1224/18, 12/17/18, 12/10/18 Labs reviewed   MEDICINE TESTS (1): None.  INDEPENDENT REVIEW (2 each): None.     The visit lasted a total of 26 minutes face to face with the patient. Over 50% of the time was spent counseling and educating the patient about leg edema, psoriasis, and rehab with transition to assisted care.     ITremaine, am scribing for and in the presence of, Dr. Kenny.    I, Dr. Kenny, personally performed the services described in this documentation, as scribed by Tremaine Macedo in my presence, and it is both accurate and complete.    Total data points: 3

## 2021-06-22 NOTE — PROGRESS NOTES
[facility]    Facility:   [facility] [[id number]]   Code Status: DNR      CHIEF COMPLAINT/REASON FOR VISIT:  Chief Complaint   Patient presents with     Review Of Multiple Medical Conditions       HISTORY:      HPI: Axel is a 85 y.o. male who presented to the hospital due to a fall in his bathroom.  He had a head contusion and he is on Coumadin for pulmonary embolus and a CT scan of the head x2 was negative for intracranial hemorrhage.  He had acute encephalopathy with unspecified probable chronic cognitive impairment and memory loss.  He did have some dysarthria noted by family.  There was a possible aspiration pneumonitis.  He had acute kidney injury which did resolve.  He has underlying medical conditions of hypertension, chronic lethargy for which he is taking Adderall, psoriasis for which he takes methotrexate, and chronic bilateral edema.  He also has osteoarthritis.  During the hospital was noted he had hyponatremia and lactic acidosis.  He does have a history of chronic pain syndrome.  He also has obesity and hypertension and pernicious anemia and vitamin D deficiency.  He does have chronic back pain.  He does have coronary artery disease and impaired fasting glucose.  He has bilateral adrenal adenomas.    Upon current review of systems he is not feeling well at all.  He has pain everywhere.  He still is having dysuria despite being on Cipro for his urinary tract infection.  His edema of his legs is much worse with steady weeping of fluid from blisters that have popped.  He is not having fevers or chills.  He does not have sore throat.  He does not have cough or shortness of breath.  He does not have chest pain or palpitations of the heart.  He does not have abdominal pain or nausea.      Past Medical History:   Diagnosis Date     Acute DVT (deep venous thrombosis), right 11/17/2015     Replacement Utility updated for latest IMO load     Adenocarcinoma of prostate (H)       Adenocarcinoma Of The Prostate Gland     Created by Conversion      Allergic rhinitis      Anemia      Ataxia     Created by Conversion      Benign Polyps Of The Large Intestine     Created by Conversion      CAD (coronary artery disease)      Cataract      HTN (hypertension)      OA (osteoarthritis)      PE (pulmonary thromboembolism) (H)      Pulmonary emboli (H) 11/16/2015     Sleep apnea              Family History   Problem Relation Age of Onset     Stroke Mother      No Medical Problems Father      Social History     Socioeconomic History     Marital status:      Spouse name: Not on file     Number of children: Not on file     Years of education: Not on file     Highest education level: Not on file   Social Needs     Financial resource strain: Not on file     Food insecurity - worry: Not on file     Food insecurity - inability: Not on file     Transportation needs - medical: Not on file     Transportation needs - non-medical: Not on file   Occupational History     Not on file   Tobacco Use     Smoking status: Former Smoker     Smokeless tobacco: Never Used   Substance and Sexual Activity     Alcohol use: No     Drug use: No     Sexual activity: Not on file   Other Topics Concern     Not on file   Social History Narrative     Not on file         Review of Systems    .There were no vitals filed for this visit.    Physical Exam   Constitutional: No distress.   Eyes: Right eye exhibits no discharge. Left eye exhibits no discharge.   Neck: No JVD present.   Cardiovascular: Normal heart sounds.   Pulmonary/Chest: Breath sounds normal. No respiratory distress.   Abdominal: Soft. Bowel sounds are normal. He exhibits no distension. There is no tenderness.   Musculoskeletal:   4+ pitting edema of the lower extremities with blistering areas that are weeping steadily.   Neurological: He is alert.   Skin:   Open blisters of lower extremities but no purulent drainage and no surrounding redness or warmth.    Psychiatric: He has a normal mood and affect.   He is discouraged   Nursing note and vitals reviewed.        LABS:   INR is 2.33.  Urine culture shows sensitivity for Cipro.    ASSESSMENT:      ICD-10-CM    1. Urinary tract infection symptoms R39.9    2. Fall, subsequent encounter W19.XXXD    3. History of pulmonary embolus (PE) Z86.711    4. Warfarin anticoagulation Z79.01    5. Bilateral lower extremity edema R60.0    6. Chronic pain syndrome G89.4        PLAN:    An additional 40 mg of Lasix will be given this morning.  Then starting tomorrow he will receive 60 mg of Lasix every morning.  His usual home dose of Lasix has been 40 mg every morning.  Increase the Pyridium to 200 mg 3 times daily for 3 days.  Tramadol 50 mg 1 pill every 4 hours as needed for pain.  BMP, CBC, INR to be checked on Monday, 12/10.  Potassium chloride will be 20 mEq twice daily.      Electronically signed by: Danilo Ordonez MD

## 2021-06-22 NOTE — PROGRESS NOTES
[facility]      Facility:    [facility] [[id number]]  Code Status: DNR      Chief Complaint/Reason for Visit:  Chief Complaint   Patient presents with     H & P       HPI:   Axel is a 85 y.o. male who presented to the hospital due to a fall in his bathroom.  He had a head contusion and he is on Coumadin for pulmonary embolus and a CT scan of the head x2 was negative for intracranial hemorrhage.  He had acute encephalopathy with unspecified probable chronic cognitive impairment and memory loss.  He did have some dysarthria noted by family.  There was a possible aspiration pneumonitis.  He had acute kidney injury which did resolve.  He has underlying medical conditions of hypertension, chronic lethargy for which he is taking Adderall, psoriasis for which he takes methotrexate, and chronic bilateral edema.  He also has osteoarthritis.  During the hospital was noted he had hyponatremia and lactic acidosis.  He does have a history of chronic pain syndrome.  He also has obesity and hypertension and pernicious anemia and vitamin D deficiency.  He does have chronic back pain.  He does have coronary artery disease and impaired fasting glucose.  He has bilateral adrenal adenomas.    Upon current review of systems, her he is feeling like he has more back to his baseline of health.  He does not have fevers or chills.  He does not have cold symptoms such as sore throat or nasal congestion.  He is not experiencing cough or shortness of breath.  He is not experiencing chest pain or palpitations of the heart.  Is not having abdominal pain or nausea.  Bowels and bladder are stable.    Past Medical History:  Past Medical History:   Diagnosis Date     Acute DVT (deep venous thrombosis), right 11/17/2015     Replacement Utility updated for latest IMO load     Adenocarcinoma of prostate (H)      Adenocarcinoma Of The Prostate Gland     Created by Conversion      Allergic rhinitis      Anemia      Ataxia      Created by Conversion      Benign Polyps Of The Large Intestine     Created by Conversion      CAD (coronary artery disease)      Cataract      HTN (hypertension)      OA (osteoarthritis)      PE (pulmonary thromboembolism) (H)      Pulmonary emboli (H) 11/16/2015     Sleep apnea            Surgical History:  Past Surgical History:   Procedure Laterality Date     MO CRYOSURG ABLATION OF PROSTATE      Description: Surgery Prostate Cryosurgical Ablation;  Proc Date: 12/01/2009;     MO INSERT LENS PROSTHESIS ONLY      Description: Insert Intraocular Prosthesis; Cataract Prev. Removed Right;  Proc Date: 07/05/2007;     MO REMOVE TONSILS/ADENOIDS,<13 Y/O      Description: Tonsillectomy With Adenoidectomy;  Recorded: 07/21/2008;     MO REVISE MEDIAN N/CARPAL TUNNEL SURG      Description: Neuroplasty Decompression Median Nerve At Carpal Tunnel;  Recorded: 07/21/2008;       Family History:   Family History   Problem Relation Age of Onset     Stroke Mother      No Medical Problems Father        Social History:    Social History     Socioeconomic History     Marital status:      Spouse name: Not on file     Number of children: Not on file     Years of education: Not on file     Highest education level: Not on file   Social Needs     Financial resource strain: Not on file     Food insecurity - worry: Not on file     Food insecurity - inability: Not on file     Transportation needs - medical: Not on file     Transportation needs - non-medical: Not on file   Occupational History     Not on file   Tobacco Use     Smoking status: Former Smoker     Smokeless tobacco: Never Used   Substance and Sexual Activity     Alcohol use: No     Drug use: No     Sexual activity: Not on file   Other Topics Concern     Not on file   Social History Narrative     Not on file          Review of Systems   All other systems reviewed and are negative.      Vitals:    11/28/18 0510   BP: 123/78   Pulse: 87   Resp: 18   Temp: 98  F (36.7  C)    SpO2: 96%       Physical Exam   Constitutional: No distress.   HENT:   Mouth/Throat: Oropharynx is clear and moist.   Eyes: Right eye exhibits no discharge. Left eye exhibits no discharge.   Neck: No JVD present. No thyromegaly present.   Cardiovascular: Normal heart sounds.   Pulmonary/Chest: Breath sounds normal. No respiratory distress.   Abdominal: Soft. Bowel sounds are normal. He exhibits no distension. There is no tenderness.   Musculoskeletal: He exhibits no tenderness.   Lymphadenopathy:     He has no cervical adenopathy.   Neurological: He is alert.   Skin: Skin is warm and dry.   Psychiatric: He has a normal mood and affect.   Nursing note and vitals reviewed.      Medication List:  Current Outpatient Medications   Medication Sig     acetaminophen (TYLENOL) 325 MG tablet Take 2 tablets (650 mg total) by mouth every 4 (four) hours as needed.     cholecalciferol, vitamin D3, (VITAMIN D3) 5,000 unit Tab Take 1 tablet (5,000 Units total) by mouth daily. (Patient taking differently: Take 5,000 Units by mouth daily .      )     dextroamphetamine-amphetamine (ADDERALL XR) 10 MG 24 hr capsule Take 1 capsule (10 mg total) by mouth daily For fatigue.     folic acid (FOLVITE) 1 MG tablet Take 2 tablets (2 mg total) by mouth daily. (Patient taking differently: Take 2 mg by mouth daily .      )     furosemide (LASIX) 40 MG tablet Take 0.5 tablets (20 mg total) by mouth daily.     lisinopril (PRINIVIL,ZESTRIL) 5 MG tablet Take 1 tablet (5 mg total) by mouth daily.     methotrexate 2.5 MG tablet Take 6 tablets (15 mg total) by mouth once a week. (Patient taking differently: Take 15 mg by mouth once a week .      )     omeprazole (PRILOSEC) 20 MG capsule Take 1 capsule (20 mg total) by mouth daily before breakfast. (Patient taking differently: Take 20 mg by mouth daily before breakfast .      )     predniSONE (DELTASONE) 10 mg tablet Take 1 tablet (10 mg total) by mouth daily. (Patient taking differently: Take 10 mg by  mouth daily .)     simvastatin (ZOCOR) 20 MG tablet Take 1 tablet (20 mg total) by mouth at bedtime.     triamcinolone (KENALOG) 0.1 % cream Apply to legs each night (Patient taking differently: Apply to legs each night.      )     warfarin sodium (WARFARIN ORAL) Take by mouth 11/27/18 INR 1.45  Take 5mg on Tues-Thurs-Sat and 2.5mg all other days.  Next INR 12/3/18.   12/3/18 INR 2.39.  Continue 5mg on Tu,Th,Sa and 2.5mg all other days.  Next INR 12/6/18..             Labs:  No new laboratory testing    Assessment:    ICD-10-CM    1. Fall, subsequent encounter W19.XXXD    2. History of pulmonary embolus (PE) Z86.711    3. Long term (current) use of anticoagulants Z79.01    4. DEMOND (acute kidney injury) (H) N17.9    5. Encephalopathy G93.40    6. Adrenal adenoma, unspecified laterality D35.00    7. Memory Lapses Or Loss R41.3        Plan:  Monitor medical conditions.  Focus upon evaluation and treatment by therapies.      Electronically signed by: Danilo Ordonez MD

## 2021-06-22 NOTE — PROGRESS NOTES
[facility]    Facility:   [facility] [[id number]]   Code Status: DNR      CHIEF COMPLAINT/REASON FOR VISIT:  Chief Complaint   Patient presents with     Review Of Multiple Medical Conditions       HISTORY:      HPI: Axel is a 85 y.o. male who presented to the hospital due to a fall in his bathroom.  He had a head contusion and he is on Coumadin for pulmonary embolus and a CT scan of the head x2 was negative for intracranial hemorrhage.  He had acute encephalopathy with unspecified probable chronic cognitive impairment and memory loss.  He did have some dysarthria noted by family.  There was a possible aspiration pneumonitis.  He had acute kidney injury which did resolve.  He has underlying medical conditions of hypertension, chronic lethargy for which he is taking Adderall, psoriasis for which he takes methotrexate, and chronic bilateral edema.  He also has osteoarthritis.  During the hospital was noted he had hyponatremia and lactic acidosis.  He does have a history of chronic pain syndrome.  He also has obesity and hypertension and pernicious anemia and vitamin D deficiency.  He does have chronic back pain.  He does have coronary artery disease and impaired fasting glucose.  He has bilateral adrenal adenomas.    Upon current review of systems, he continues to have significant fluid loss from the blisters and ulcerations of his left lower extremity but as long as he has compression and there is no drainage.  The assisted living facility is not able to manage twice daily dressing changes are able to do the once daily dressing changes.  He has not had new problems of fevers or chills nor cough or worsening shortness of breath nor chest pain or palpitations of the heart or abdominal pain or nausea.    Past Medical History:   Diagnosis Date     Acute DVT (deep venous thrombosis), right 11/17/2015     Replacement Utility updated for latest IMO load     Adenocarcinoma of prostate (H)       Adenocarcinoma Of The Prostate Gland     Created by Conversion      Allergic rhinitis      Anemia      Ataxia     Created by Conversion      Benign Polyps Of The Large Intestine     Created by Conversion      CAD (coronary artery disease)      Cataract      HTN (hypertension)      OA (osteoarthritis)      PE (pulmonary thromboembolism) (H)      Pulmonary emboli (H) 11/16/2015     Sleep apnea              Family History   Problem Relation Age of Onset     Stroke Mother      No Medical Problems Father      Social History     Socioeconomic History     Marital status:      Spouse name: Not on file     Number of children: Not on file     Years of education: Not on file     Highest education level: Not on file   Social Needs     Financial resource strain: Not on file     Food insecurity - worry: Not on file     Food insecurity - inability: Not on file     Transportation needs - medical: Not on file     Transportation needs - non-medical: Not on file   Occupational History     Not on file   Tobacco Use     Smoking status: Former Smoker     Smokeless tobacco: Never Used   Substance and Sexual Activity     Alcohol use: No     Drug use: No     Sexual activity: Not on file   Other Topics Concern     Not on file   Social History Narrative     Not on file         Review of Systems    .  Vitals:    12/19/18 1019   BP: 143/80   Pulse: 89   Resp: 17   Temp: 98.3  F (36.8  C)   SpO2: 96%       Physical Exam   Constitutional: No distress.   Eyes: Right eye exhibits no discharge. Left eye exhibits no discharge.   Neck: No JVD present.   Cardiovascular: Normal heart sounds.   Pulmonary/Chest: Breath sounds normal. No respiratory distress.   Musculoskeletal:   Compression dressings present and no drainage through the dressing at this time   Neurological: He is alert.   Psychiatric: He has a normal mood and affect.   Nursing note and vitals reviewed.        LABS:   No new laboratory testing    ASSESSMENT:      ICD-10-CM    1.  Fall, subsequent encounter W19.XXXD    2. Bilateral lower extremity edema R60.0    3. Muscle weakness (generalized) M62.81    4. Venous stasis ulcer of left lower extremity (H) I83.029     L90.611        PLAN:    Impression to be applied 24/7 and just have this removed at the time of the dressing change.      Electronically signed by: Danilo Ordonez MD

## 2021-06-22 NOTE — PROGRESS NOTES
[facility]    NAME:  Axel Gonzales             :  1932  MRN: [id number]  CODE STATUS:  DNR    FACILITY:  [facility] [[id number]]       ROOM:   124    CHIEF COMPLAINT/REASON FOR VISIT:  Chief Complaint   Patient presents with     Problem Visit     Fall with concussion with loss of consciousness     HISTORY OF PRESENT ILLNESS: Axel Gonzales is a 85 y.o. male with hearing loss, h/o PE on coumadin,  History of renal artery stenosis, Bilateral adrenal adenomas, MEGHANA not on CPAP, chronic lethargy on adderall xr, psoriasis presented to ED due to a fall.       Fall in bathroom: suspect mechanical. Head contusion on coumadin with CT head x2 negative for ICH.  CT C/T/L spine no acute fractures.  See official report for details. Telemetry no dysrhythmias.      Acute encephalopathy, unspecified with probable chronic cognitive impairment/memory loss: CT head neg x2.  VBG no hypercarbia or hypoxia.  TSH 0.38, B12 1464. UA neg for infection.  DEMOND on admission and received 4mg IM Morphine so suspect some of the confusion was related to this and delayed clearance of opiate.   However, per spouse had confusion a few days preceding admission which was not his baseline.  EtOH <10.  Initial leukocytosis 15K and normalized quickly.  CXR single view suggested Nodular opacity right perihilar region. Repeat 2V CXR negative.  Procalcitonin 0.06.  Afebrile.  BC's x2 negative.  Per Neurology will not perform additional w/u such as MRI at this time to exclude CVA. Although I'm concerned with his slight dysarthria and some word finding difficulties acute stroke is possible.  But as I explained to family he's already on Coumadin, and was started on Zocor 20mg qhs due to his LDL of 199, but otherwise management would not be different otherwise.  Also, he cannot lie down flat, and has slept in a recliner for years, and they agreed he would not tolerate an MRI.  Appears near baseline at discharge.   PT/OT at TCU.      Possible aspiration pneumonitis: speech therapy consulted and bedside swallow evaluation negative for signs/symptoms of aspiration.  Repeat CXR with PA/Lateral negative for any acute infiltrate.   BC's negative.  No hypoxia.  Normal procalcitonin.  No antibiotics continued at discharge.     H/O PE:  continue warfarin.  Subtherapeutic INR at discharge so dose increased to 3mg daily.  Normally on 2.5mg daily.  Repeat INR on 11/26 with TCU physician to adjust dose accordingly.     DEMOND: resolved     HTN:Continue Lisinopril as prescribed and adjust dose accordingly. Hold HCTZ.  Monitor closely at TCU.     Chronic lethargy: resumed adderall xr after d/w neurology as patient has been on it chronically for fatigue and lethargy per his PCP.  However, decreased dose to 10mg daily.  Ultimately, I would recommend discontinuation of this agent.     MEGHANA: failed cpap in the past. No hypercarbia on admission. Untreated MEGHANA likely contributor to his chronic Fatigue     Psoriasis: resumed MTX at discharge. Cont Folic acid     Chronic BLE edema: on warfarin chronic with therapeutic INR on admission so doubt acute DVT.  BNP 52 so unlikely acute decompensated heart failure.  Lasix at decreased dose of 20mg daily. Lymphedema wraps should continue daily at TCU.     Chronic OA: has been on chronic prednisone 10mg daily per review of PCP note in 8/2018.      The patient had a younger catheter placed in the ED on admission.  The patient is to have the younger removed on 11/26 at the TCU and a voiding trial is to be performed per TCU protocol.  He should not have younger continued long-term.  Patient was transferred to TCU for continued rehabilitation.    Past Medical History:   Diagnosis Date     Acute DVT (deep venous thrombosis), right 11/17/2015     Replacement Utility updated for latest IMO load     Adenocarcinoma of prostate (H)      Adenocarcinoma Of The Prostate Gland     Created by Conversion      Allergic rhinitis       Anemia      Ataxia     Created by Conversion      Benign Polyps Of The Large Intestine     Created by Conversion      CAD (coronary artery disease)      Cataract      HTN (hypertension)      OA (osteoarthritis)      PE (pulmonary thromboembolism) (H)      Pulmonary emboli (H) 11/16/2015     Sleep apnea      Past Surgical History:   Procedure Laterality Date     MO CRYOSURG ABLATION OF PROSTATE      Description: Surgery Prostate Cryosurgical Ablation;  Proc Date: 12/01/2009;     MO INSERT LENS PROSTHESIS ONLY      Description: Insert Intraocular Prosthesis; Cataract Prev. Removed Right;  Proc Date: 07/05/2007;     MO REMOVE TONSILS/ADENOIDS,<13 Y/O      Description: Tonsillectomy With Adenoidectomy;  Recorded: 07/21/2008;     MO REVISE MEDIAN N/CARPAL TUNNEL SURG      Description: Neuroplasty Decompression Median Nerve At Carpal Tunnel;  Recorded: 07/21/2008;     Family History   Problem Relation Age of Onset     Stroke Mother      No Medical Problems Father      Social History     Socioeconomic History     Marital status:      Spouse name: Not on file     Number of children: Not on file     Years of education: Not on file     Highest education level: Not on file   Social Needs     Financial resource strain: Not on file     Food insecurity - worry: Not on file     Food insecurity - inability: Not on file     Transportation needs - medical: Not on file     Transportation needs - non-medical: Not on file   Occupational History     Not on file   Tobacco Use     Smoking status: Former Smoker     Smokeless tobacco: Never Used   Substance and Sexual Activity     Alcohol use: No     Drug use: No     Sexual activity: Not on file   Other Topics Concern     Not on file   Social History Narrative     Not on file     No Known Allergies  Current Outpatient Medications   Medication Sig Dispense Refill     acetaminophen (TYLENOL) 325 MG tablet Take 2 tablets (650 mg total) by mouth every 4 (four) hours as needed.  0      cholecalciferol, vitamin D3, (VITAMIN D3) 5,000 unit Tab Take 1 tablet (5,000 Units total) by mouth daily. (Patient taking differently: Take 5,000 Units by mouth daily .      ) 90 tablet 3     dextroamphetamine-amphetamine (ADDERALL XR) 10 MG 24 hr capsule Take 1 capsule (10 mg total) by mouth daily For fatigue. 3 capsule 0     folic acid (FOLVITE) 1 MG tablet Take 2 tablets (2 mg total) by mouth daily. (Patient taking differently: Take 2 mg by mouth daily .      ) 60 tablet 11     furosemide (LASIX) 40 MG tablet Take 0.5 tablets (20 mg total) by mouth daily. 90 tablet 3     lisinopril (PRINIVIL,ZESTRIL) 5 MG tablet Take 1 tablet (5 mg total) by mouth daily.  0     methotrexate 2.5 MG tablet Take 6 tablets (15 mg total) by mouth once a week. (Patient taking differently: Take 15 mg by mouth once a week .      ) 24 tablet 11     omeprazole (PRILOSEC) 20 MG capsule Take 1 capsule (20 mg total) by mouth daily before breakfast. (Patient taking differently: Take 20 mg by mouth daily before breakfast .      ) 90 capsule 3     predniSONE (DELTASONE) 10 mg tablet Take 1 tablet (10 mg total) by mouth daily. (Patient taking differently: Take 10 mg by mouth daily .) 30 tablet 11     simvastatin (ZOCOR) 20 MG tablet Take 1 tablet (20 mg total) by mouth at bedtime.  0     triamcinolone (KENALOG) 0.1 % cream Apply to legs each night (Patient taking differently: Apply to legs each night.      ) 85.2 g 11     warfarin sodium (WARFARIN ORAL) Take by mouth 11/27/18 INR 1.45  Take 5mg on Tues-Thurs-Sat and 2.5mg all other days.  Next INR 12/3/18. .       Current Facility-Administered Medications   Medication Dose Route Frequency Provider Last Rate Last Dose     cyanocobalamin injection 1,000 mcg  1,000 mcg Intramuscular Q30 Days Danis Kenny MD   1,000 mcg at 06/19/18 1051       REVIEW OF SYSTEMS:    Currently, no fever, chills, or rigors. Does not have any visual or hearing problems. Denies any chest pain, headaches,  palpitations, lightheadedness, dizziness, shortness of breath, or cough. Appetite is good. Denies any GERD symptoms. Denies any difficulty with swallowing, nausea, or vomiting.  Denies any abdominal pain, diarrhea or constipation. Denies any urinary symptoms. No insomnia. No active bleeding. No rash.       PHYSICAL EXAMINATION:  Vitals:    11/28/18 2044   BP: 123/78   Pulse: 87   Resp: 18   Temp: 98  F (36.7  C)   SpO2: 96%   Weight: (!) 229 lb 4.8 oz (104 kg)       GENERAL: Awake, Alert, oriented x3, not in any form of acute distress, answers questions appropriately, follows simple commands, conversant  HEENT: Head is normocephalic with normal hair distribution. No evidence of trauma. Ears: No acute purulent discharge. Eyes: Conjunctivae pink with no scleral jaundice. Nose: Normal mucosa and septum. NECK: Supple with no cervical or supraclavicular lymphadenopathy. Trachea is midline.   CHEST: No tenderness or deformity, no crepitus  LUNG: Clear to auscultation with good chest expansion. There are no crackles or wheezes, normal AP diameter.  BACK: No kyphosis of the thoracic spine. Symmetric, no curvature, ROM normal, no CVA tenderness, no spinal tenderness   CVS: There is good S1  S2, there are no murmurs, rubs, gallops, or heaves, rhythm is regular,  2+ pulses symmetric in all extremities.  ABDOMEN: Globular and soft, nontender to palpation, non distended, no masses, no organomegaly, good bowel sounds, no rebound or guarding, no peritoneal signs.   EXTREMITIES:  Full range of motion on both upper and lower extremities, there is no tenderness to palpation, no pedal edema, no cyanosis or clubbing, no calf tenderness.  Pulses equal in all extremities, normal cap refill, no joint swelling.  SKIN: Warm and dry, no erythema noted.  Skin color, texture, no rashes or lesions.  NEUROLOGICAL: The patient is oriented to person, place and time. Strength and sensation are grossly intact. Face is symmetric.    LABS:  Al    Lab  Results   Component Value Date    WBC 8.7 11/24/2018    HGB 11.1 (L) 11/24/2018    HCT 31.6 (L) 11/24/2018     (H) 11/24/2018     11/24/2018     Results for orders placed or performed during the hospital encounter of 11/22/18   Basic Metabolic Panel   Result Value Ref Range    Sodium 135 (L) 136 - 145 mmol/L    Potassium 3.7 3.5 - 5.0 mmol/L    Chloride 98 98 - 107 mmol/L    CO2 22 22 - 31 mmol/L    Anion Gap, Calculation 15 5 - 18 mmol/L    Glucose 126 (H) 70 - 125 mg/dL    Calcium 9.3 8.5 - 10.5 mg/dL    BUN 50 (H) 8 - 28 mg/dL    Creatinine 1.70 (H) 0.70 - 1.30 mg/dL    GFR MDRD Af Amer 47 (L) >60 mL/min/1.73m2    GFR MDRD Non Af Amer 38 (L) >60 mL/min/1.73m2     Lab Results   Component Value Date    HGBA1C 6.0 10/03/2016     Vitamin D, Total (25-Hydroxy)   Date Value Ref Range Status   05/03/2018 45.8 30.0 - 80.0 ng/mL Final     Lab Results   Component Value Date    ISSOTDJA03 1,464 (H) 11/22/2018       ASSESSMENT/PLAN:    1. Fall, initial encounter - Continue PT/OT   2. Concussion with loss of consciousness, initial encounter - Improving   3. Long term (current) use of anticoagulants - Anticoagulated on Coumadin.  Refused INR today but has agreed to have it done tomorrow   4. Chronic BLE edema - Continue Lymphedema wraps    5. Chronic pain syndrome - Continue Prednisone, Mehtotrexate, and Tylenol   6. Urinary retention - Patient declined younger removal today but will have it done on Wednesday         Electronically signed by:  Lexy Addison CNP    35 minutes TT of which 50% was spent in counseling and coordination of care of the above plan.    Time spent in interview and examination of patient, review of available records, and discussion with nursing staff. Continue care plan, efforts at therapy, and monitor nutritional status.

## 2021-06-22 NOTE — PROGRESS NOTES
[facility] Patient Outreach:     Discharge Date::  12/21/18      Reason for TCU stay (discharge diagnosis)::  Fall with head trauma, encephalopathy, pneumonia, DEMOND, chronic pain      Are you feeling better, the same or worse since your discharge?:  Patient is feeling better          As part of your discharge plan, did they discuss home care with you?: Yes        Have your seen them yet, or are they scheduled to visit?: Yes                Do you have any follow up visits scheduled with your PCP or Specialist?:  No          I'm glad to hear you're doing well and we want you to continue to do well. Your PCP would like to see you for a follow-up visit. Can we help set that up for your today?: Yes            (RN) Patient transferred to [facility]? **If immediate concers (e.g. patient is feeling worse and/or not taking new medictations), send in basket message to PCP with quick summary of concern.: Yes

## 2021-06-22 NOTE — PROGRESS NOTES
[facility]    NAME:  Axel Gonzales             :  1932  MRN: [id number]  CODE STATUS:  DNR    VISIT TYPE: DISCHARGE SUMMARY  FACILYTY: [facility] [[id number]]                    PRIMARY CARE PROVIDER: Danis Kenny MD    DISCHARGE DIAGNOSIS:      1. Fall, subsequent encounter    2. Bilateral lower extremity edema    3. Muscle weakness (generalized)    4. Atherosclerosis of coronary artery, angina presence unspecified, unspecified vessel or lesion type, unspecified whether native or transplanted heart    5. Essential hypertension    6. Warfarin anticoagulation    7. Chronic low back pain, unspecified back pain laterality, with sciatica presence unspecified         DISCHARGE MEDICATIONS:      Current Outpatient Medications   Medication Sig Dispense Refill     acetaminophen (TYLENOL) 325 MG tablet Take 2 tablets (650 mg total) by mouth every 4 (four) hours as needed.  0     cholecalciferol, vitamin D3, (VITAMIN D3) 5,000 unit Tab Take 1 tablet (5,000 Units total) by mouth daily. (Patient taking differently: Take 5,000 Units by mouth daily .      ) 90 tablet 3     dextroamphetamine-amphetamine (ADDERALL XR) 10 MG 24 hr capsule Take 1 capsule (10 mg total) by mouth daily. For fatigue 3 capsule 0     ferrous sulfate 325 (65 FE) MG tablet Take 1 tablet (325 mg total) by mouth daily with breakfast. 30 tablet 0     folic acid (FOLVITE) 1 MG tablet Take 2 tablets (2 mg total) by mouth daily for 10 days. 20 tablet 0     furosemide (LASIX) 40 MG tablet Take 1 tablet (40 mg total) by mouth 2 (two) times a day at 9am and 6pm for 10 days. 20 tablet 0     Lactobacillus rhamnosus GG (CULTURELLE) 10-15 Billion cell capsule Take 1 capsule by mouth daily for 21 days. (for 21 days) 21 capsule 0     lisinopril-hydrochlorothiazide (PRINZIDE,ZESTORETIC) 20-25 mg per tablet Take 1 tablet by mouth daily. 10 tablet 0     omeprazole (PRILOSEC) 20 MG capsule Take 1 capsule (20 mg total) by  mouth daily before breakfast. 10 capsule 0     polyethylene glycol (MIRALAX) 17 gram packet Take 17 g by mouth daily.       potassium chloride (KLOR-CON) 20 mEq packet Take 20 mEq by mouth daily for 7 days. 7 packet 0     senna-docusate (SENNOSIDES-DOCUSATE SODIUM) 8.6-50 mg tablet Take 1 tablet by mouth 2 (two) times a day.       simvastatin (ZOCOR) 20 MG tablet Take 1 tablet (20 mg total) by mouth at bedtime. 7 tablet 0     traMADol (ULTRAM) 50 mg tablet Take 50 mg by mouth every 4 (four) hours as needed for pain.       triamcinolone (KENALOG) 0.1 % cream Apply to legs each night (Patient taking differently: Apply to legs each night.      ) 85.2 g 11     warfarin (COUMADIN/JANTOVEN) 2.5 MG tablet Take 1 tablet daily Mon, Wed, Fri, Sun and 2 tablets daily on Tues, Thurs, Sat 10 tablet 0     Current Facility-Administered Medications   Medication Dose Route Frequency Provider Last Rate Last Dose     cyanocobalamin injection 1,000 mcg  1,000 mcg Intramuscular Q30 Days Danis Kenny MD   1,000 mcg at 06/19/18 1051       HISTORY OF PRESENT ILLNESS: Axel Gonzales is a 85 y.o. male  with hearing loss, h/o PE on coumadin,  History of renal artery stenosis, Bilateral adrenal adenomas, MEGHANA not on CPAP, chronic lethargy on adderall xr, psoriasis presented to ED due to a fall.      Hospital Course:   -Fall in bathroom: suspect mechanical. Head contusion on coumadin with CT head x2 negative for ICH.  CT C/T/L spine no acute fractures.  See official report for details. Telemetry no dysrhythmias.   -Acute encephalopathy, unspecified with probable chronic cognitive impairment/memory loss: CT head neg x2.  VBG no hypercarbia or hypoxia.  TSH 0.38, B12 1464. UA neg for infection.  DEMOND on admission and received 4mg IM Morphine so suspect some of the confusion was related to this and delayed clearance of opiate.   However, per spouse had confusion a few days preceding admission which was not his baseline.  EtOH <10.  Initial  leukocytosis 15K and normalized quickly.  CXR single view suggested Nodular opacity right perihilar region. Repeat 2V CXR negative.  Procalcitonin 0.06.  Afebrile.  BC's x2 negative.  Per Neurology will not perform additional w/u such as MRI at this time to exclude CVA. Although I'm concerned with his slight dysarthria and some word finding difficulties acute stroke is possible.  But as I explained to family he's already on Coumadin, and was started on Zocor 20mg qhs due to his LDL of 199, but otherwise management would not be different otherwise.  Also, he cannot lie down flat, and has slept in a recliner for years, and they agreed he would not tolerate an MRI.  Appears near baseline at discharge.  PT/OT at TCU.   -Possible aspiration pneumonitis: speech therapy consulted and bedside swallow evaluation negative for signs/symptoms of aspiration.  Repeat CXR with PA/Lateral negative for any acute infiltrate.   BC's negative.  No hypoxia.  Normal procalcitonin.  No antibiotics continued at discharge.  -H/O PE:  continue warfarin.  Subtherapeutic INR at discharge so dose increased to 3mg daily.  Normally on 2.5mg daily.  Repeat INR on 11/26 with TCU physician to adjust dose accordingly.  -DEMOND: resolved  -HTN:Continue Lisinopril as prescribed and adjust dose accordingly. Hold HCTZ.  Monitor closely at TCU.  -Chronic lethargy: resumed adderall xr after d/w neurology as patient has been on it chronically for fatigue and lethargy per his PCP.  However, decreased dose to 10mg daily.  Ultimately, I would recommend discontinuation of this agent.  -MEGHANA: failed cpap in the past. No hypercarbia on admission. Untreated MEGHANA likely contributor to his chronic Fatigue  -Psoriasis: resumed MTX at discharge. Cont Folic acid  -Chronic BLE edema: on warfarin chronic with therapeutic INR on admission so doubt acute DVT.  BNP 52 so unlikely acute decompensated heart failure.  Lasix at decreased dose of 20mg daily. Lymphedema wraps should  continue daily at TCU.  -Chronic OA: has been on chronic prednisone 10mg daily per review of PCP note in 8/2018.   -The patient had a younger catheter placed in the ED on admission.  The patient is to have the younger removed on 11/26 at the TCU and a voiding trial is to be performed per TCU protocol.  He should not have younger continued long-term.  Patient was transferred to TCU for continued rehabilitation.    SKILLED NURSING FACILITY COURSE:  During this TCU stay, patient completed all anticipated goals of therapy.    1. Urinary tract infection symptoms R39.9 Completed antibiotic. S/sx have resolved.    2. Warfarin anticoagulation Z79.01 Stable. INR 2.32.  Will continue 2.5 mg on M,W,F,Sun and 5 mg on T,Th, and Sat. Recheck INR on 12/20/18.   3. Bilateral lower extremity edema R60.0 Lasix was increased to 60mg daily on 12/6/18 with some imiprovement. Weight is unchanged.    4. Constipation K59.00 Improved with adjustment to bowel medications to include Miralax. Daily and Senna-S two times a day    5. Hypokalemia E87.6 Improved with Potassium chloride supplement 20 mEq two times a day. Last Potassium level  3.8.    6. Essential hypertension I10 Blood pressures are within target range. Continue Lisinopril and Lasix.   7. DEMOND (acute kidney injury) (H) N17.9 Resolved. DEMOND occurred in hospital. Reassessed status today and BMP is normal. No evidence of DEMOND at this time.   8. Anemia D64.9 Last Hemoglobin 10.4. Ordered Ferrous sulfate 325mg PO. Will continue to monitor.     PHYSICAL EXAMINATION:    Vitals:    12/25/18 1524   BP: 157/80   Pulse: 84   Resp: 16   Temp: 98.2  F (36.8  C)   SpO2: 98%   Weight: (!) 228 lb (103.4 kg)       GENERAL: Awake, Alert, oriented x3, not in any form of acute distress, answers questions appropriately, follows simple commands, conversant  HEENT: Head is normocephalic with normal hair distribution. No evidence of trauma. Ears: No acute purulent discharge. Eyes: Conjunctivae pink with no scleral  jaundice. Nose: Normal mucosa and septum. NECK: Supple with no cervical or supraclavicular lymphadenopathy. Trachea is midline.   CHEST: No tenderness or deformity, no crepitus  LUNG: Clear to auscultation with good chest expansion. There are no crackles or wheezes, normal AP diameter.  BACK: No kyphosis of the thoracic spine. Symmetric, no curvature, ROM normal, no CVA tenderness, no spinal tenderness   CVS: There is good S1  S2, there are no murmurs, rubs, gallops, or heaves, rhythm is regular,  2+ pulses symmetric in all extremities.  ABDOMEN: Globular and soft, nontender to palpation, non distended, no masses, no organomegaly, good bowel sounds, no rebound or guarding, no peritoneal signs.   EXTREMITIES: Decreased range of motion on both upper and lower extremities, there is no tenderness to palpation, no pedal edema, no cyanosis or clubbing, no calf tenderness.  Pulses equal in all extremities, normal cap refill, no joint swelling.  SKIN: Warm and dry, no erythema noted.  Skin color, texture, no rashes or lesions.  NEUROLOGICAL: The patient is oriented to person, place and time. Strength and sensation are grossly intact. Face is symmetric.    LABS:      Lab Results   Component Value Date    WBC 9.9 12/10/2018    HGB 10.4 (L) 12/10/2018    HCT 31.8 (L) 12/10/2018     (H) 12/10/2018     12/10/2018     Results for orders placed or performed in visit on 12/17/18   Basic Metabolic Panel   Result Value Ref Range    Sodium 136 136 - 145 mmol/L    Potassium 3.8 3.5 - 5.0 mmol/L    Chloride 102 98 - 107 mmol/L    CO2 25 22 - 31 mmol/L    Anion Gap, Calculation 9 5 - 18 mmol/L    Glucose 91 70 - 125 mg/dL    Calcium 9.5 8.5 - 10.5 mg/dL    BUN 20 8 - 28 mg/dL    Creatinine 0.80 0.70 - 1.30 mg/dL    GFR MDRD Af Amer >60 >60 mL/min/1.73m2    GFR MDRD Non Af Amer >60 >60 mL/min/1.73m2       Lab Results   Component Value Date    [id number] 1,464 (H) 11/22/2018     Lab Results   Component Value Date    TSH 0.38  11/22/2018     Vitamin D, Total (25-Hydroxy)   Date Value Ref Range Status   05/03/2018 45.8 30.0 - 80.0 ng/mL Final       DISCHARGE PLAN:  I certify that this patient is under my care and that I or the nurse practitioner working with me, had a face-to-face encounter that meets the physician face-to-face encounter requirements with this patient.   Date of Face-to-Face Encounter: 12/17/2018    This patient is homebound because: The patient cannot leave home without  considerable and taxing effort.  Patient has muscle weakness with frequent falls and chronic low back pain.  He requires the aid of a wheelchair and needs the assistance of another person.    I certify that, based on my findings, the following services are medically necessary home health services: PT, OT, RN, and HHA    My clinical findings support the need for the above skilled services because: Patient to be followed by home care for physical therapy to eval and treat for strengthening, balance, endurance, and safety with mobility, and ambulation.Patient to be followed by home care for occupational therapy to eval and treat for strengthening, ADL needs, adaptive equipment, and safety. Patient to be followed by home care for nursing services for medication set up and teaching, symptom and disease processes monitoring and education. Patient to be followed by home care for home health aid services for bathing and ADL needs.    The patient is, or has been, under my care and I have initiated the establishment of the plan of care. This patient will be followed by a physician who will periodically review the plan of care.   Planned discharge.  All therapy goals have been met.  Family will assist with discharge and transportation.  Patient will follow up with PCP within 7-10 days after discharge for medication mangagment and appropriate lab studies.        Electronically signed by:  Lexy Addison CNP      For documentation purposes, chart review,  medication management, and discharge coordination of care was greater than 35 minutes

## 2021-06-23 NOTE — TELEPHONE ENCOUNTER
FYI - Status Update  Who is Calling: Ashtyn from Occupational Medication  Update: Patient is being discharged from OT services due to meeting all goals.   Okay to leave a detailed message?:  No return call needed

## 2021-06-23 NOTE — TELEPHONE ENCOUNTER
Refill Approved    Rx renewed per Medication Renewal Policy. Medication was last renewed on 12/21/18. 1/2/19.    Darleen Andersen, [facility] Triage/Med Refill 1/29/2019     Requested Prescriptions   Pending Prescriptions Disp Refills     ferrous sulfate 325 (65 FE) MG tablet [Pharmacy Med Name: FEROSUL TAB 325MG] 28 tablet 11     Sig: TAKE 1 TABLET BY MOUTH ONCE DAILY    Iron Supplements Refill Protocol Passed - 1/28/2019  7:52 PM       Passed - PCP or prescribing provider visit in past 12 months      Last office visit with prescriber/PCP: 1/2/2019 Danis Kenny MD OR same dept: 1/2/2019 Danis Kenny MD OR same specialty: 1/2/2019 Danis Kenny MD  Last physical: Visit date not found Last MTM visit: Visit date not found   Next visit within 3 mo: Visit date not found  Next physical within 3 mo: Visit date not found  Prescriber OR PCP: Danis Kenny MD  Last diagnosis associated with med order: 1. Lymphedema of right lower extremity  - furosemide (LASIX) 40 MG tablet [Pharmacy Med Name: FUROSEMIDE TAB 40MG]; TAKE 1 TABLET BY MOUTH ONCE DAILY  Dispense: 28 tablet; Refill: 11  - potassium chloride (K-DUR,KLOR-CON) 20 MEQ tablet [Pharmacy Med Name: POT CL MICRO TAB 20MEQ ER]; TAKE 1 TABLET BY MOUTH ONCE DAILY  Dispense: 28 tablet; Refill: 11    2. Psoriasis  - predniSONE (DELTASONE) 5 MG tablet [Pharmacy Med Name: PREDNISONE TAB 5MG]; TAKE 1 TABLET BY MOUTH ONCE DAILY  Dispense: 28 tablet; Refill: 11    If protocol passes may refill for 12 months if within 3 months of last provider visit (or a total of 15 months).            Passed - Hemoglobin or Hematocrit in last 12 months    Hemoglobin   Date Value Ref Range Status   01/02/2019 11.2 (L) 14.0 - 18.0 g/dL Final     Hematocrit   Date Value Ref Range Status   01/02/2019 33.1 (L) 40.0 - 54.0 % Final             folic acid (FOLVITE) 1 MG tablet [Pharmacy Med Name: FOLIC ACID TAB 1000MCG] 56 tablet 11     Sig: TAKE TWO TABLETS (2000MCG) BY  MOUTH ONCE DAILY    There is no refill protocol information for this order        furosemide (LASIX) 40 MG tablet [Pharmacy Med Name: FUROSEMIDE TAB 40MG] 28 tablet 11     Sig: TAKE 1 TABLET BY MOUTH ONCE DAILY    Diuretics/Combination Diuretics Refill Protocol  Passed - 1/28/2019  7:52 PM       Passed - Visit with PCP or prescribing provider visit in past 12 months    Last office visit with prescriber/PCP: 1/2/2019 Danis Kenny MD OR same dept: 1/2/2019 Danis Kenny MD OR same specialty: 1/2/2019 Danis Kenny MD  Last physical: Visit date not found Last MTM visit: Visit date not found   Next visit within 3 mo: Visit date not found  Next physical within 3 mo: Visit date not found  Prescriber OR PCP: Danis Kenny MD  Last diagnosis associated with med order: 1. Lymphedema of right lower extremity  - furosemide (LASIX) 40 MG tablet [Pharmacy Med Name: FUROSEMIDE TAB 40MG]; TAKE 1 TABLET BY MOUTH ONCE DAILY  Dispense: 28 tablet; Refill: 11  - potassium chloride (K-DUR,KLOR-CON) 20 MEQ tablet [Pharmacy Med Name: POT CL MICRO TAB 20MEQ ER]; TAKE 1 TABLET BY MOUTH ONCE DAILY  Dispense: 28 tablet; Refill: 11    2. Psoriasis  - predniSONE (DELTASONE) 5 MG tablet [Pharmacy Med Name: PREDNISONE TAB 5MG]; TAKE 1 TABLET BY MOUTH ONCE DAILY  Dispense: 28 tablet; Refill: 11    If protocol passes may refill for 12 months if within 3 months of last provider visit (or a total of 15 months).            Passed - Serum Potassium in past 12 months     Lab Results   Component Value Date    Potassium 4.5 01/02/2019            Passed - Serum Sodium in past 12 months     Lab Results   Component Value Date    Sodium 145 01/02/2019            Passed - Blood pressure on file in past 12 months    BP Readings from Last 1 Encounters:   01/02/19 90/54            Passed - Serum Creatinine in past 12 months     Creatinine   Date Value Ref Range Status   01/02/2019 2.20 (H) 0.70 - 1.30 mg/dL Final             lisinopril  (PRINIVIL,ZESTRIL) 5 MG tablet [Pharmacy Med Name: LISINOPRIL TAB 5MG] 28 tablet 11     Sig: TAKE 1 TABLET BY MOUTH ONCE DAILY    Ace Inhibitors Refill Protocol Passed - 1/28/2019  7:52 PM       Passed - PCP or prescribing provider visit in past 12 months      Last office visit with prescriber/PCP: 1/2/2019 Danis Kenny MD OR same dept: 1/2/2019 Danis Kenny MD OR same specialty: 1/2/2019 Danis Kenny MD  Last physical: Visit date not found Last MTM visit: Visit date not found   Next visit within 3 mo: Visit date not found  Next physical within 3 mo: Visit date not found  Prescriber OR PCP: Danis Kenny MD  Last diagnosis associated with med order: 1. Lymphedema of right lower extremity  - furosemide (LASIX) 40 MG tablet [Pharmacy Med Name: FUROSEMIDE TAB 40MG]; TAKE 1 TABLET BY MOUTH ONCE DAILY  Dispense: 28 tablet; Refill: 11  - potassium chloride (K-DUR,KLOR-CON) 20 MEQ tablet [Pharmacy Med Name: POT CL MICRO TAB 20MEQ ER]; TAKE 1 TABLET BY MOUTH ONCE DAILY  Dispense: 28 tablet; Refill: 11    2. Psoriasis  - predniSONE (DELTASONE) 5 MG tablet [Pharmacy Med Name: PREDNISONE TAB 5MG]; TAKE 1 TABLET BY MOUTH ONCE DAILY  Dispense: 28 tablet; Refill: 11    If protocol passes may refill for 12 months if within 3 months of last provider visit (or a total of 15 months).            Passed - Serum Potassium in past 12 months    Lab Results   Component Value Date    Potassium 4.5 01/02/2019            Passed - Blood pressure filed in past 12 months    BP Readings from Last 1 Encounters:   01/02/19 90/54            Passed - Serum Creatinine in past 12 months    Creatinine   Date Value Ref Range Status   01/02/2019 2.20 (H) 0.70 - 1.30 mg/dL Final             omeprazole (PRILOSEC) 20 MG capsule [Pharmacy Med Name: OMEPRAZOLE CAP 20MG] 28 capsule 11     Sig: TAKE 1 CAPSULE BY MOUTH ONCE DAILY    GI Medications Refill Protocol Passed - 1/28/2019  7:52 PM       Passed - PCP or prescribing provider  visit in last 12 or next 3 months.    Last office visit with prescriber/PCP: 1/2/2019 Danis Kenny MD OR same dept: 1/2/2019 Danis Kenny MD OR same specialty: 1/2/2019 Danis Kenny MD  Last physical: Visit date not found Last MTM visit: Visit date not found   Next visit within 3 mo: Visit date not found  Next physical within 3 mo: Visit date not found  Prescriber OR PCP: Danis Kenny MD  Last diagnosis associated with med order: 1. Lymphedema of right lower extremity  - furosemide (LASIX) 40 MG tablet [Pharmacy Med Name: FUROSEMIDE TAB 40MG]; TAKE 1 TABLET BY MOUTH ONCE DAILY  Dispense: 28 tablet; Refill: 11  - potassium chloride (K-DUR,KLOR-CON) 20 MEQ tablet [Pharmacy Med Name: POT CL MICRO TAB 20MEQ ER]; TAKE 1 TABLET BY MOUTH ONCE DAILY  Dispense: 28 tablet; Refill: 11    2. Psoriasis  - predniSONE (DELTASONE) 5 MG tablet [Pharmacy Med Name: PREDNISONE TAB 5MG]; TAKE 1 TABLET BY MOUTH ONCE DAILY  Dispense: 28 tablet; Refill: 11    If protocol passes may refill for 12 months if within 3 months of last provider visit (or a total of 15 months).             potassium chloride (K-DUR,KLOR-CON) 20 MEQ tablet [Pharmacy Med Name: POT CL MICRO TAB 20MEQ ER] 28 tablet 11     Sig: TAKE 1 TABLET BY MOUTH ONCE DAILY    Potassium Supplements Refill Protocol Passed - 1/28/2019  7:52 PM       Passed - PCP or prescribing provider visit in past 12 months      Last office visit with prescriber/PCP: 1/2/2019 Danis Kenny MD OR same dept: 1/2/2019 Danis Kenny MD OR same specialty: 1/2/2019 Danis Kenny MD  Last physical: Visit date not found Last MTM visit: Visit date not found   Next visit within 3 mo: Visit date not found  Next physical within 3 mo: Visit date not found  Prescriber OR PCP: Danis Kenny MD  Last diagnosis associated with med order: 1. Lymphedema of right lower extremity  - furosemide (LASIX) 40 MG tablet [Pharmacy Med Name: FUROSEMIDE TAB 40MG]; TAKE 1  TABLET BY MOUTH ONCE DAILY  Dispense: 28 tablet; Refill: 11  - potassium chloride (K-DUR,KLOR-CON) 20 MEQ tablet [Pharmacy Med Name: POT CL MICRO TAB 20MEQ ER]; TAKE 1 TABLET BY MOUTH ONCE DAILY  Dispense: 28 tablet; Refill: 11    2. Psoriasis  - predniSONE (DELTASONE) 5 MG tablet [Pharmacy Med Name: PREDNISONE TAB 5MG]; TAKE 1 TABLET BY MOUTH ONCE DAILY  Dispense: 28 tablet; Refill: 11    If protocol passes may refill for 12 months if within 3 months of last provider visit (or a total of 15 months).            Passed - Potassium level in last 12 months    Lab Results   Component Value Date    Potassium 4.5 01/02/2019             predniSONE (DELTASONE) 5 MG tablet [Pharmacy Med Name: PREDNISONE TAB 5MG] 28 tablet 11     Sig: TAKE 1 TABLET BY MOUTH ONCE DAILY    There is no refill protocol information for this order        STOOL SOFTENER-LAXATIVE 8.6-50 mg tablet [Pharmacy Med Name: SENNA/DSS TAB 8.6-50MG] 56 tablet 11     Sig: TAKE 1 TABLET BY MOUTH TWICE DAILY    GI Medications Refill Protocol Passed - 1/28/2019  7:52 PM       Passed - PCP or prescribing provider visit in last 12 or next 3 months.    Last office visit with prescriber/PCP: 1/2/2019 Danis Kenny MD OR same dept: 1/2/2019 Danis Kenny MD OR same specialty: 1/2/2019 Danis Kenny MD  Last physical: Visit date not found Last MTM visit: Visit date not found   Next visit within 3 mo: Visit date not found  Next physical within 3 mo: Visit date not found  Prescriber OR PCP: Danis Kenny MD  Last diagnosis associated with med order: 1. Lymphedema of right lower extremity  - furosemide (LASIX) 40 MG tablet [Pharmacy Med Name: FUROSEMIDE TAB 40MG]; TAKE 1 TABLET BY MOUTH ONCE DAILY  Dispense: 28 tablet; Refill: 11  - potassium chloride (K-DUR,KLOR-CON) 20 MEQ tablet [Pharmacy Med Name: POT CL MICRO TAB 20MEQ ER]; TAKE 1 TABLET BY MOUTH ONCE DAILY  Dispense: 28 tablet; Refill: 11    2. Psoriasis  - predniSONE (DELTASONE) 5 MG  tablet [Pharmacy Med Name: PREDNISONE TAB 5MG]; TAKE 1 TABLET BY MOUTH ONCE DAILY  Dispense: 28 tablet; Refill: 11    If protocol passes may refill for 12 months if within 3 months of last provider visit (or a total of 15 months).             cholecalciferol, vitamin D3, 5,000 unit capsule [Pharmacy Med Name: VITAMIN D3 CAP 5000UNIT] 29 capsule 11     Sig: TAKE 1 CAPSULE BY MOUTH ONCE DAILY    There is no refill protocol information for this order

## 2021-06-23 NOTE — TELEPHONE ENCOUNTER
Just fine to use the lotion anyway they want to as often as they want to.  Provide a verbal order for their request from me with refills as often as needed

## 2021-06-23 NOTE — TELEPHONE ENCOUNTER
INR result is  1.81  INR   Date Value Ref Range Status   01/28/2019 1.81 (H) 0.90 - 1.10 Final       Will the patient be seen, or did they already see, MD or CNP today? No    Most Recent Warfarin dose day/week  Sunday Monday Tuesday Wednesday Thursday Friday Saturday   2.5 2.5 5 2.5 5 2.5 2.5     Sunday Monday Tuesday Wednesday Thursday Friday Saturday                Has the patient missed any doses of Coumadin, Warfarin, Jantoven in the past 7 days? No    Has the patients medications changed since the last visit? No    Has the patient experienced any bleeding recently? No    Has the patient experienced any injuries or illness recently? No    Has the patient experienced any 'new' shortness of breath, severe headaches, or changes in vision recently? No    Has the patient had any changes in their diet, or alcohol consumption? No    Is the patient here today to prepare for any type of upcoming surgery, procedure, or for a cardioversion procedure? No    What phone number can we reach the patient at today? home phone listed in demographics.

## 2021-06-23 NOTE — TELEPHONE ENCOUNTER
INR result is 2.35  INR   Date Value Ref Range Status   01/14/2019 2.35 (H) 0.90 - 1.10 Final       Will the patient be seen, or did they already see, MD or CNP today? No    Most Recent Warfarin dose day/week  Sunday Monday Tuesday Wednesday Thursday Friday Saturday    2.5 5 2.5 5 2.5 2.5     Sunday Monday Tuesday Wednesday Thursday Friday Saturday   2.5             Has the patient missed any doses of Coumadin, Warfarin, Jantoven in the past 7 days? No    Has the patients medications changed since the last visit? Yes changed dose of his prednisone, decreased his lasix to 1 time a day, stopped adderall and simvastatin was discontinued and decreased potassium to 1 time a day    Has the patient experienced any bleeding recently? No    Has the patient experienced any injuries or illness recently? No    Has the patient experienced any 'new' shortness of breath, severe headaches, or changes in vision recently? No    Has the patient had any changes in their diet, or alcohol consumption? No    Is the patient here today to prepare for any type of upcoming surgery, procedure, or for a cardioversion procedure? No    What phone number can we reach the patient at today? Please call Yelena back with [facility].

## 2021-06-23 NOTE — TELEPHONE ENCOUNTER
ANTICOAGULATION  MANAGEMENT- Home Care/Care Facility Result    Assessment     Today's INR result of 2.3 is Therapeutic (goal INR of 2.0-3.0)        Warfarin taken as previously instructed    No new diet changes affecting INR    No new medication/supplements affecting INR changes are from ov 1/2    Continues to tolerate warfarin with no reported s/s of bleeding or thromboembolism     Previous INR was Therapeutic    Plan:     Spoke with nurse Yelena discussed INR result and instructed:     Warfarin Dosing Instructions: Continue current warfarin dose 5 mg daily on tue/thu; and 2.5 mg daily rest of week  (0 % change)    Next INR to be drawn: two weeks      Yelena verbalizes understanding and agrees to warfarin dosing plan.   ?   Angel Holly RN    Subjective/Objective:      Axel Gonzales, a 86 y.o. male is established on warfarin.     Home care/care facility RN's report of Axel INR, recent warfarin dosing, diet changes, medication changes, and symptoms is documented below.    Additional findings: none    Anticoagulation Episode Summary     Current INR goal:   2.0-3.0   TTR:   60.6 % (3.1 y)   Next INR check:   1/28/2019   INR from last check:      Weekly max warfarin dose:      Target end date:      INR check location:      Preferred lab:      Send INR reminders to:   ANTICOAGULATION POOL A (WBY,WBE,MID,RSC)    Indications    Long term (current) use of anticoagulants [Z79.01]  Pulmonary emboli (H) (Resolved) [I26.99]           Comments:            Anticoagulation Care Providers     Provider Role Specialty Phone number    Danis Kenny MD  Internal Medicine [phone]

## 2021-06-23 NOTE — TELEPHONE ENCOUNTER
RN cannot approve Refill Request    RN can NOT refill this medication medication not on med list.       Darleen Andersen, [facility] Triage/Med Refill 1/29/2019    Requested Prescriptions   Pending Prescriptions Disp Refills     folic acid (FOLVITE) 1 MG tablet [Pharmacy Med Name: FOLIC ACID TAB 1000MCG] 62 tablet 11     Sig: TAKE TWO TABLETS (2000MCG) BY MOUTH ONCE DAILY    There is no refill protocol information for this order        STOOL SOFTENER-LAXATIVE 8.6-50 mg tablet [Pharmacy Med Name: SENNA/DSS TAB 8.6-50MG] 62 tablet 11     Sig: TAKE 1 TABLET BY MOUTH TWICE DAILY    GI Medications Refill Protocol Passed - 1/28/2019  7:52 PM       Passed - PCP or prescribing provider visit in last 12 or next 3 months.    Last office visit with prescriber/PCP: 1/2/2019 Danis Kenny MD OR same dept: 1/2/2019 Danis Kenny MD OR same specialty: 1/2/2019 Danis Kenny MD  Last physical: Visit date not found Last MTM visit: Visit date not found   Next visit within 3 mo: Visit date not found  Next physical within 3 mo: Visit date not found  Prescriber OR PCP: Danis Kenny MD  Last diagnosis associated with med order: 1. Lymphedema of right lower extremity  - furosemide (LASIX) 40 MG tablet; TAKE 1 TABLET BY MOUTH ONCE DAILY  Dispense: 31 tablet; Refill: 11  - potassium chloride (K-DUR,KLOR-CON) 20 MEQ tablet; TAKE 1 TABLET BY MOUTH ONCE DAILY  Dispense: 31 tablet; Refill: 11    2. Psoriasis  - predniSONE (DELTASONE) 5 MG tablet; TAKE 1 TABLET BY MOUTH ONCE DAILY  Dispense: 31 tablet; Refill: 11    3. Anemia  - ferrous sulfate 325 (65 FE) MG tablet; TAKE 1 TABLET BY MOUTH ONCE DAILY  Dispense: 31 tablet; Refill: 11    4. GERD (gastroesophageal reflux disease)  - omeprazole (PRILOSEC) 20 MG capsule; TAKE 1 CAPSULE BY MOUTH ONCE DAILY  Dispense: 31 capsule; Refill: 11    If protocol passes may refill for 12 months if within 3 months of last provider visit (or a total of 15 months).              cholecalciferol, vitamin D3, 5,000 unit capsule [Pharmacy Med Name: VITAMIN D3 CAP 5000UNIT] 31 capsule 11     Sig: TAKE 1 CAPSULE BY MOUTH ONCE DAILY    There is no refill protocol information for this order      Signed Prescriptions Disp Refills     ferrous sulfate 325 (65 FE) MG tablet 31 tablet 11     Sig: TAKE 1 TABLET BY MOUTH ONCE DAILY    Iron Supplements Refill Protocol Passed - 1/28/2019  7:52 PM       Passed - PCP or prescribing provider visit in past 12 months      Last office visit with prescriber/PCP: 1/2/2019 Danis Kenny MD OR same dept: 1/2/2019 Danis Kenny MD OR same specialty: 1/2/2019 Danis Kenny MD  Last physical: Visit date not found Last MTM visit: Visit date not found   Next visit within 3 mo: Visit date not found  Next physical within 3 mo: Visit date not found  Prescriber OR PCP: Danis Kenny MD  Last diagnosis associated with med order: 1. Lymphedema of right lower extremity  - furosemide (LASIX) 40 MG tablet; TAKE 1 TABLET BY MOUTH ONCE DAILY  Dispense: 31 tablet; Refill: 11  - potassium chloride (K-DUR,KLOR-CON) 20 MEQ tablet; TAKE 1 TABLET BY MOUTH ONCE DAILY  Dispense: 31 tablet; Refill: 11    2. Psoriasis  - predniSONE (DELTASONE) 5 MG tablet; TAKE 1 TABLET BY MOUTH ONCE DAILY  Dispense: 31 tablet; Refill: 11    3. Anemia  - ferrous sulfate 325 (65 FE) MG tablet; TAKE 1 TABLET BY MOUTH ONCE DAILY  Dispense: 31 tablet; Refill: 11    4. GERD (gastroesophageal reflux disease)  - omeprazole (PRILOSEC) 20 MG capsule; TAKE 1 CAPSULE BY MOUTH ONCE DAILY  Dispense: 31 capsule; Refill: 11    If protocol passes may refill for 12 months if within 3 months of last provider visit (or a total of 15 months).            Passed - Hemoglobin or Hematocrit in last 12 months    Hemoglobin   Date Value Ref Range Status   01/02/2019 11.2 (L) 14.0 - 18.0 g/dL Final     Hematocrit   Date Value Ref Range Status   01/02/2019 33.1 (L) 40.0 - 54.0 % Final             furosemide  (LASIX) 40 MG tablet 31 tablet 11     Sig: TAKE 1 TABLET BY MOUTH ONCE DAILY    Diuretics/Combination Diuretics Refill Protocol  Passed - 1/28/2019  7:52 PM       Passed - Visit with PCP or prescribing provider visit in past 12 months    Last office visit with prescriber/PCP: 1/2/2019 Danis Kenny MD OR same dept: 1/2/2019 Danis Kenny MD OR same specialty: 1/2/2019 Danis Kenny MD  Last physical: Visit date not found Last MTM visit: Visit date not found   Next visit within 3 mo: Visit date not found  Next physical within 3 mo: Visit date not found  Prescriber OR PCP: Danis Kenny MD  Last diagnosis associated with med order: 1. Lymphedema of right lower extremity  - furosemide (LASIX) 40 MG tablet; TAKE 1 TABLET BY MOUTH ONCE DAILY  Dispense: 31 tablet; Refill: 11  - potassium chloride (K-DUR,KLOR-CON) 20 MEQ tablet; TAKE 1 TABLET BY MOUTH ONCE DAILY  Dispense: 31 tablet; Refill: 11    2. Psoriasis  - predniSONE (DELTASONE) 5 MG tablet; TAKE 1 TABLET BY MOUTH ONCE DAILY  Dispense: 31 tablet; Refill: 11    3. Anemia  - ferrous sulfate 325 (65 FE) MG tablet; TAKE 1 TABLET BY MOUTH ONCE DAILY  Dispense: 31 tablet; Refill: 11    4. GERD (gastroesophageal reflux disease)  - omeprazole (PRILOSEC) 20 MG capsule; TAKE 1 CAPSULE BY MOUTH ONCE DAILY  Dispense: 31 capsule; Refill: 11    If protocol passes may refill for 12 months if within 3 months of last provider visit (or a total of 15 months).            Passed - Serum Potassium in past 12 months     Lab Results   Component Value Date    Potassium 4.5 01/02/2019            Passed - Serum Sodium in past 12 months     Lab Results   Component Value Date    Sodium 145 01/02/2019            Passed - Blood pressure on file in past 12 months    BP Readings from Last 1 Encounters:   01/02/19 90/54            Passed - Serum Creatinine in past 12 months     Creatinine   Date Value Ref Range Status   01/02/2019 2.20 (H) 0.70 - 1.30 mg/dL Final              omeprazole (PRILOSEC) 20 MG capsule 31 capsule 11     Sig: TAKE 1 CAPSULE BY MOUTH ONCE DAILY    GI Medications Refill Protocol Passed - 1/28/2019  7:52 PM       Passed - PCP or prescribing provider visit in last 12 or next 3 months.    Last office visit with prescriber/PCP: 1/2/2019 Danis Kenny MD OR same dept: 1/2/2019 Danis Kenny MD OR same specialty: 1/2/2019 Danis Kenny MD  Last physical: Visit date not found Last MTM visit: Visit date not found   Next visit within 3 mo: Visit date not found  Next physical within 3 mo: Visit date not found  Prescriber OR PCP: Danis Kenny MD  Last diagnosis associated with med order: 1. Lymphedema of right lower extremity  - furosemide (LASIX) 40 MG tablet; TAKE 1 TABLET BY MOUTH ONCE DAILY  Dispense: 31 tablet; Refill: 11  - potassium chloride (K-DUR,KLOR-CON) 20 MEQ tablet; TAKE 1 TABLET BY MOUTH ONCE DAILY  Dispense: 31 tablet; Refill: 11    2. Psoriasis  - predniSONE (DELTASONE) 5 MG tablet; TAKE 1 TABLET BY MOUTH ONCE DAILY  Dispense: 31 tablet; Refill: 11    3. Anemia  - ferrous sulfate 325 (65 FE) MG tablet; TAKE 1 TABLET BY MOUTH ONCE DAILY  Dispense: 31 tablet; Refill: 11    4. GERD (gastroesophageal reflux disease)  - omeprazole (PRILOSEC) 20 MG capsule; TAKE 1 CAPSULE BY MOUTH ONCE DAILY  Dispense: 31 capsule; Refill: 11    If protocol passes may refill for 12 months if within 3 months of last provider visit (or a total of 15 months).             potassium chloride (K-DUR,KLOR-CON) 20 MEQ tablet 31 tablet 11     Sig: TAKE 1 TABLET BY MOUTH ONCE DAILY    Potassium Supplements Refill Protocol Passed - 1/28/2019  7:52 PM       Passed - PCP or prescribing provider visit in past 12 months      Last office visit with prescriber/PCP: 1/2/2019 Danis Kenny MD OR same dept: 1/2/2019 Danis Kenny MD OR same specialty: 1/2/2019 Danis Kenny MD  Last physical: Visit date not found Last MTM visit: Visit date not found    Next visit within 3 mo: Visit date not found  Next physical within 3 mo: Visit date not found  Prescriber OR PCP: Danis Kenny MD  Last diagnosis associated with med order: 1. Lymphedema of right lower extremity  - furosemide (LASIX) 40 MG tablet; TAKE 1 TABLET BY MOUTH ONCE DAILY  Dispense: 31 tablet; Refill: 11  - potassium chloride (K-DUR,KLOR-CON) 20 MEQ tablet; TAKE 1 TABLET BY MOUTH ONCE DAILY  Dispense: 31 tablet; Refill: 11    2. Psoriasis  - predniSONE (DELTASONE) 5 MG tablet; TAKE 1 TABLET BY MOUTH ONCE DAILY  Dispense: 31 tablet; Refill: 11    3. Anemia  - ferrous sulfate 325 (65 FE) MG tablet; TAKE 1 TABLET BY MOUTH ONCE DAILY  Dispense: 31 tablet; Refill: 11    4. GERD (gastroesophageal reflux disease)  - omeprazole (PRILOSEC) 20 MG capsule; TAKE 1 CAPSULE BY MOUTH ONCE DAILY  Dispense: 31 capsule; Refill: 11    If protocol passes may refill for 12 months if within 3 months of last provider visit (or a total of 15 months).            Passed - Potassium level in last 12 months    Lab Results   Component Value Date    Potassium 4.5 01/02/2019             predniSONE (DELTASONE) 5 MG tablet 31 tablet 11     Sig: TAKE 1 TABLET BY MOUTH ONCE DAILY    There is no refill protocol information for this order      Refused Prescriptions Disp Refills     lisinopril (PRINIVIL,ZESTRIL) 5 MG tablet [Pharmacy Med Name: LISINOPRIL TAB 5MG] 28 tablet 11     Sig: TAKE 1 TABLET BY MOUTH ONCE DAILY    Ace Inhibitors Refill Protocol Passed - 1/28/2019  7:52 PM       Passed - PCP or prescribing provider visit in past 12 months      Last office visit with prescriber/PCP: 1/2/2019 Danis Kenny MD OR same dept: 1/2/2019 Danis Kenny MD OR same specialty: 1/2/2019 Danis Kenny MD  Last physical: Visit date not found Last MT visit: Visit date not found   Next visit within 3 mo: Visit date not found  Next physical within 3 mo: Visit date not found  Prescriber OR PCP: Danis Kenny MD  Last  diagnosis associated with med order: 1. Lymphedema of right lower extremity  - furosemide (LASIX) 40 MG tablet; TAKE 1 TABLET BY MOUTH ONCE DAILY  Dispense: 31 tablet; Refill: 11  - potassium chloride (K-DUR,KLOR-CON) 20 MEQ tablet; TAKE 1 TABLET BY MOUTH ONCE DAILY  Dispense: 31 tablet; Refill: 11    2. Psoriasis  - predniSONE (DELTASONE) 5 MG tablet; TAKE 1 TABLET BY MOUTH ONCE DAILY  Dispense: 31 tablet; Refill: 11    3. Anemia  - ferrous sulfate 325 (65 FE) MG tablet; TAKE 1 TABLET BY MOUTH ONCE DAILY  Dispense: 31 tablet; Refill: 11    4. GERD (gastroesophageal reflux disease)  - omeprazole (PRILOSEC) 20 MG capsule; TAKE 1 CAPSULE BY MOUTH ONCE DAILY  Dispense: 31 capsule; Refill: 11    If protocol passes may refill for 12 months if within 3 months of last provider visit (or a total of 15 months).            Passed - Serum Potassium in past 12 months    Lab Results   Component Value Date    Potassium 4.5 01/02/2019            Passed - Blood pressure filed in past 12 months    BP Readings from Last 1 Encounters:   01/02/19 90/54            Passed - Serum Creatinine in past 12 months    Creatinine   Date Value Ref Range Status   01/02/2019 2.20 (H) 0.70 - 1.30 mg/dL Final

## 2021-06-23 NOTE — TELEPHONE ENCOUNTER
Orders being requested: Order for Desitin to be applied to rash on back.  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 Nurse don't have an order for Desitin. Patient stated he got it 3 days ago, no notes from 3 days ago found.  When are orders needed by: As soon as possible  Where to send Orders: Fax: [phone]  Okay to leave detailed message?  Yes [phone], Ask for Nursing Dept.    Medication Question or Clarification  Who is calling: Other: Nadege from the [facility] [place]  What medication are you calling about?: prednisone  What dose do you take?: 5 mg  How often are you taking the medication?: 1 tablet daily  Who prescribed the medication?: Dr. Kenny  What is your question/concern?: Nurses from the [facility] have this medication as discontinued. Has this been discontinued or should patient still be taking it, please advise.  [facility]acy: [facility]  Okay to leave a detailed message?: Yes  Site CMT - Please call the pharmacy to obtain any additional needed information.  Order Providers   Prescribing Provider Encounter Provider   Danis Kenny MD Brombach, William A, MD   Medication Detail    Disp Refills Start End    predniSONE (DELTASONE) 5 MG tablet 30 tablet 11 1/2/2019 1/2/2020    Sig - Route: Take 5 mg by mouth daily. - Oral    Sent to pharmacy as: predniSONE (DELTASONE) 5 MG tablet    E-Prescribing Status: Receipt confirmed by pharmacy (1/2/2019  3:33 PM CST)    Associated Diagnoses   Psoriasis  - Primary       Pharmacy  [facility] - [place], [place] - [address]

## 2021-06-23 NOTE — TELEPHONE ENCOUNTER
ANTICOAGULATION  MANAGEMENT- Home Care/Care Facility Result    Assessment     Today's INR result of 1.8 is Subtherapeutic (goal INR of 2.0-3.0)        Warfarin taken as previously instructed    No new diet changes affecting INR    No new medication/supplements affecting INR    Continues to tolerate warfarin with no reported s/s of bleeding or thromboembolism     Previous INR was Therapeutic     Phoned to [facility] pharmacy    Plan:     Spoke with nurse Yelena discussed INR result and instructed:     Warfarin Dosing Instructions: Change warfarin dose to 5 mg daily on tue/thu/sat; and 2.5 mg daily rest of week  (11 % change)    Next INR to be drawn: two weeks      Yelena verbalizes understanding and agrees to warfarin dosing plan.   ?   Angel Holly RN    Subjective/Objective:      Axel Gonzales, a 86 y.o. male is established on warfarin.     Home care/care facility RN's report of Axel INR, recent warfarin dosing, diet changes, medication changes, and symptoms is documented below.    Additional findings: verbally confirmed home dose with nurse Yelena and updated on anticoagulation calendar    Anticoagulation Episode Summary     Current INR goal:   2.0-3.0   TTR:   60.6 % (3.1 y)   Next INR check:   2/11/2019   INR from last check:   1.81! (1/28/2019)   Weekly max warfarin dose:      Target end date:      INR check location:      Preferred lab:      Send INR reminders to:   ANTICOAGULATION POOL A (WBY,WBE,MID,RSC)    Indications    Long term (current) use of anticoagulants [Z79.01]  Pulmonary emboli (H) (Resolved) [I26.99]           Comments:            Anticoagulation Care Providers     Provider Role Specialty Phone number    Danis Kenny MD  Internal Medicine [phone]

## 2021-06-24 NOTE — PROGRESS NOTES
ASSESSMENT:  1. Lymphedema of right lower extremity  Swelling worse with decrease in furosemide.  Renal function necessitated same.  Will resume hydrochlorothiazide.  Reviewed notes from 1 year ago when we went through a similar process.  With history of adrenal adenoma, also add Spironolactone    2. Adrenal nodule (H)  Careful search fails to yield prove however he is quite certain that he has had adrenal nodules.  Trial of low-dose Spironolactone  - spironolactone (ALDACTONE) 25 MG tablet; Take 1 tablet (25 mg total) by mouth daily.  Dispense: 90 tablet; Refill: 3    3. Essential hypertension  Discontinue lisinopril and replace with losartan.  Add HCTZ  - losartan-hydrochlorothiazide (HYZAAR) 100-25 mg per tablet; Take 1 tablet by mouth daily.  Dispense: 90 tablet; Refill: 3    4. Long term (current) use of anticoagulants  Stable    5. Pernicious anemia  B12 shots monthly per nursing home note.  He denies receiving these.  We will not address further    6. Psoriasis  Continue methotrexate.  Update methotrexate monitoring    7. Psoriatic arthritis (H)  Paperwork for wheelchair, which he successfully negotiated before.  He did quite well with it before.  Update labs on methotrexate.  Continue low-dose prednisone  - Wheelchair, electric  - HM2(CBC w/o Differential)  - Comprehensive Metabolic Panel    8. Fatigue, unspecified type  Repeat trial of Adderall.  Previous notes reviewed  - dextroamphetamine-amphetamine (ADDERALL XR) 15 MG 24 hr capsule; Take 1 capsule (15 mg total) by mouth daily.  Dispense: 30 capsule; Refill: 0    9. Impaired fasting glucose  Check for diabetes which could contribute to fatigue  - Glycosylated Hemoglobin A1c    10. Warfarin anticoagulation  - INR    11. Personal history of PE (pulmonary embolism)  - INR        PLAN:  Patient Instructions   Losartan/hctz 100/25 each morning for swelling and blood pressure    Stop Lisinopril    Add back adderall 15 mgs each morning which we did back in  "august for energy    Stop Miralax and senna for contipation    Change furosemide to evening with the warfarin    Add spironolactone 25 mgs each morning for swelling associated with nodules in the adrenal glands    Stop potassium because we are adding spironolactone    See back in one month      Orders Placed This Encounter   Procedures     Wheelchair, electric     Height: 5' 10\" (1.778 m)     Weight: Wt Readings from Last 1 Encounters:  03/13/19 : (!) 229 lb (103.9 kg)       Order Specific Question:   The face to face evaluation was performed on:     Answer:   3/13/2019     Comprehensive Metabolic Panel     Glycosylated Hemoglobin A1C     Standing Status:   Future     Standing Expiration Date:   3/11/2020     HM2(CBC w/o Differential)     Standing Status:   Future     Standing Expiration Date:   3/11/2020     Medications Discontinued During This Encounter   Medication Reason     polyethylene glycol (GLYCOLAX) 17 gram/dose powder      polyethylene glycol (MIRALAX) 17 gram packet      potassium chloride (K-DUR,KLOR-CON) 20 MEQ tablet      STOOL SOFTENER-LAXATIVE 8.6-50 mg tablet        Return in about 4 weeks (around 4/10/2019).      CHIEF COMPLAINT:  Chief Complaint   Patient presents with     Mobility Examination     Follow-up       HISTORY OF PRESENT ILLNESS:  Axel is a 86 y.o. male presenting to the clinic today for a mobility examination for a motorized wheelchair. He is also here to follow up on his edema and with complaints of fatigue. He is accompanied by his wife.     Limited Mobility: The main reason he is here today is for a mobility examination and to discuss getting a personal mobility device. He has had a [employer] motorized wheelchair in the past, but it broke down. He struggles with ambulation due to his psoriatic arthritis, muscle weakness, history of CVA, and memory impairment. A PMD is necessary for him to get to different areas in his building such as the dining zuleta. He cannot use a cane or " walker due to poor balance and history of falls. He cannot use a manual wheelchair because of pain and lack of upper body strength. He is not able to use a POV due to postural instability. Per his wife, he used to be much more active and social in his assisted living facility when he had the motorized chair. He would roam the building and talk to other residents all the time, and it seemed to be good for his mental health. Now that he does not have the motorized wheelchair, he cannot get around easily and spends most of his time in his room. He does not even go to the dining center to eat; he has his meals delivered to his room. He was physically and mentally able to safely operate his motorized chair in the past and nothing has changed in that respect. He is willing and motivated to get another motorized chair because it greatly improved his quality of life.     Edema: The edema in his legs has returned and he is starting to develop blisters. He was discharged from physical and occupational therapy, and his legs have been getting worse since then. His wife thinks that was a mistake, so they have now decided to start coming again three days per week for the next five weeks. He thinks his weight has been fairly stable. He states he sometimes urinates frequently after taking his diuretic. The swelling tends to be worse in his right leg. His edema seems to be the same in the morning as it is other times of day.     Possible CVA: He was in the hospital in November after a fall. They were concerned he might have suffered a small stroke at that time, but they were not able to say so definitively. His wife still thinks it might have been a stroke. He got confused and fell down while walking into the bathroom; he was not aware of what was going on and seemed disoriented. His speech was affected at first, but this has improved with speech therapy. The fall and confusion seemed to be an isolated event, and he is basically  "back to normal now.     Fatigue: He is always fatigued and feels weak. He sleeps well, and he would even say more than he should. He does not think Adderall was helpful. His wife thinks increasing the dose was helpful in August.      Hyperlipidemia: He stopped simvastatin for possible statin related myalgias when he was seen here in January. He does not have any aches and pains now other than the discomfort related to his lower extremity edema.     Psoriasis: His psoriasis has been stable. It mostly affects his left arm and his scalp.     REVIEW OF SYSTEMS:   His right hand shakes, so he has been learning to eat with his left hand, but he states his right hand has been bad for a long time. He gets up to urinate a couple of times per night. His mood has been good. He does not have any significant pain above the waist. His bowels move well. He does not take the stool softener. His back is no longer itching. He does not recall getting a vitamin B12 shot when he was here in January. He has not been getting vitamin B12 shots at his living facility. He has known of the nodule on his adrenal gland for a long time. He denies upper respiratory infection symptoms. All other systems are negative.     PFSH:  He and his wife struggle to find good transportation. They live in the [place]. He has never had a heart attack or diabetes.     TOBACCO USE:  Social History     Tobacco Use   Smoking Status Former Smoker   Smokeless Tobacco Never Used       VITALS:  Vitals:    03/13/19 1239   BP: 136/74   Patient Site: Left Arm   Patient Position: Sitting   Cuff Size: Adult Large   Pulse: 84   Resp: 18   SpO2: 94%   Weight: (!) 229 lb (103.9 kg)   Height: 5' 10\" (1.778 m)     Wt Readings from Last 3 Encounters:   03/13/19 (!) 229 lb (103.9 kg)   12/21/18 (!) 231 lb (104.8 kg)   12/25/18 (!) 228 lb (103.4 kg)     Body mass index is 32.86 kg/m .    PHYSICAL EXAM:  Constitutional:  Reveals an alert, pleasant, talkative, elderly " man who presents in a wheelchair. Affect appropriate.  Vitals:  Per nursing notes.  Cardiac:  Regular rate and rhythm without murmurs, rubs, or gallops. Legs with 2+ edema bilaterally. Palpation of the radial pulse regular.  Skin: Psoriasis of left forearm  Neurologic:  Cranial nerves II-XII intact.     Psychiatric:  Mood appropriate, memory intact.     MEDICATIONS:  Current Outpatient Medications   Medication Sig Dispense Refill     acetaminophen (TYLENOL) 325 MG tablet Take 2 tablets (650 mg total) by mouth every 4 (four) hours as needed.  0     cholecalciferol, vitamin D3, 5,000 unit capsule TAKE 1 CAPSULE BY MOUTH ONCE DAILY 31 capsule 11     dextroamphetamine-amphetamine (ADDERALL XR) 15 MG 24 hr capsule Take 1 capsule (15 mg total) by mouth daily. 30 capsule 0     ferrous sulfate 325 (65 FE) MG tablet TAKE 1 TABLET BY MOUTH ONCE DAILY 31 tablet 11     folic acid (FOLVITE) 1 MG tablet TAKE TWO TABLETS (2000MCG) BY MOUTH ONCE DAILY 62 tablet 11     furosemide (LASIX) 40 MG tablet TAKE 1 TABLET BY MOUTH ONCE DAILY 31 tablet 11     liver oil-zinc oxide (DESITIN) ointment Apply topically daily as needed for dry skin. 56.7 g 12     losartan-hydrochlorothiazide (HYZAAR) 100-25 mg per tablet Take 1 tablet by mouth daily. 90 tablet 3     methotrexate 15 MG tablet Take 15 mg by mouth once a week.       omeprazole (PRILOSEC) 20 MG capsule TAKE 1 CAPSULE BY MOUTH ONCE DAILY 31 capsule 11     predniSONE (DELTASONE) 5 MG tablet TAKE 1 TABLET BY MOUTH ONCE DAILY 31 tablet 11     spironolactone (ALDACTONE) 25 MG tablet Take 1 tablet (25 mg total) by mouth daily. 90 tablet 3     triamcinolone (KENALOG) 0.1 % cream Apply to legs each night (Patient taking differently: Apply to legs each night.      ) 85.2 g 11     warfarin (COUMADIN/JANTOVEN) 2.5 MG tablet Take 1 tablet daily Mon, Wed, Fri, Sun and 2 tablets daily on Tues, Thurs, Sat 10 tablet 0     Current Facility-Administered Medications   Medication Dose Route Frequency  Provider Last Rate Last Dose     cyanocobalamin injection 1,000 mcg  1,000 mcg Intramuscular Q30 Days Danis Kenny MD   1,000 mcg at 06/19/18 1051       ADDITIONAL HISTORY SUMMARIZED (2): Reviewed November hospital notes regarding possible CVA. Reviewed notes from last year regarding medication changes, specifically Adderall  DECISION TO OBTAIN EXTRA INFORMATION (1): None.   RADIOLOGY TESTS (1): None.  LABS (1): Labs from 1/2/2019 reviewed. Labs ordered.   MEDICINE TESTS (1): Echocardiogram showing left ventricular function of 60%  INDEPENDENT REVIEW (2 each): None.     The visit lasted a total of 34 minutes face to face with the patient. Over 50% of the time was spent counseling and educating the patient about his edema and fatigue.    I, Reilly Robert, am scribing for and in the presence of, Dr. Kenny.    I, Dr. Kenny, personally performed the services described in this documentation, as scribed by Reilly Robert in my presence, and it is both accurate and complete.    Total data points: 4

## 2021-06-24 NOTE — TELEPHONE ENCOUNTER
INR result is 3.69 from Tuesday 03.05.19  INR   Date Value Ref Range Status   03/05/2019 3.69 (H) 0.90 - 1.10 Final       Will the patient be seen, or did they already see, MD or CNP today? No    Most Recent Warfarin dose day/week  Sunday Monday Tuesday Wednesday Thursday Friday Saturday      2.5 5 5 5     Sunday Monday Tuesday Wednesday Thursday Friday Saturday   5 5 held           Has the patient missed any doses of Coumadin, Warfarin, Jantoven in the past 7 days? No    Has the patients medications changed since the last visit? No-but intermittently refusing medications    Has the patient experienced any bleeding recently? No    Has the patient experienced any injuries or illness recently? No    Has the patient experienced any 'new' shortness of breath, severe headaches, or changes in vision recently? No    Has the patient had any changes in their diet, or alcohol consumption? No    Is the patient here today to prepare for any type of upcoming surgery, procedure, or for a cardioversion procedure? No    What phone number can we reach the patient at today? home phone listed in demographics.

## 2021-06-24 NOTE — TELEPHONE ENCOUNTER
Left detailed message for Moira at [place] and gave verbal okay for below orders.  Advised Moira to call back with further questions.

## 2021-06-24 NOTE — TELEPHONE ENCOUNTER
ANTICOAGULATION  MANAGEMENT- Home Care/Care Facility Result    Assessment     3/5's INR result of 3.69 is Supratherapeutic (goal INR of 2.0-3.0)        Warfarin taken as previously instructed    No new diet changes affecting INR    No new medication/supplements affecting INR    Continues to tolerate warfarin with no reported s/s of bleeding or thromboembolism     Previous INR was Therapeutic       Plan:     Spoke with nurse from [place] discussed INR result and instructed:     Warfarin Dosing Instructions: Held on 3/5. Change warfarin dose to 2.5 mg daily on Wed, Sat; and 5 mg daily rest of week  (7.7 % change)    ACN called [facility] with this dosing.     Next INR to be drawn: 1 week (3/12).     Education provided: importance of taking warfarin as instructed    Eylena verbalizes understanding and agrees to warfarin dosing plan.   ?   Karri Barajas RN    Subjective/Objective:      Axel Gonzales, a 86 y.o. male is established on warfarin.     Home care/care facility RN's report of Axel INR, recent warfarin dosing, diet changes, medication changes, and symptoms is documented below.    Additional findings: verbally confirmed home dose with Yelena and updated on anticoagulation calendar    Anticoagulation Episode Summary     Current INR goal:   2.0-3.0   TTR:   59.6 % (3.2 y)   Next INR check:   3/12/2019   INR from last check:   3.69! (3/5/2019)   Weekly max warfarin dose:      Target end date:      INR check location:      Preferred lab:      Send INR reminders to:   ANTICOAGULATION POOL A (WBY,WBE,MID,RSC)    Indications    Long term (current) use of anticoagulants [Z79.01]  Pulmonary emboli (H) (Resolved) [I26.99]           Comments:            Anticoagulation Care Providers     Provider Role Specialty Phone number    Danis Kenny MD  Internal Medicine [phone]

## 2021-06-24 NOTE — TELEPHONE ENCOUNTER
Orders being requested: Skilled nursing wound care three times a week for five weeks.  OT to eval and treat.  Lymphedema eval and treat.  Reason service is needed/diagnosis: L97.829  When are orders needed by: ASAP  Where to send Orders: Phone:  [phone]  Okay to leave detailed message?  Yes

## 2021-06-24 NOTE — TELEPHONE ENCOUNTER
INR result is 2.33 from 2/25  INR   Date Value Ref Range Status   02/25/2019 2.33 (H) 0.90 - 1.10 Final       Will the patient be seen, or did they already see, MD or CNP today? No    Most Recent Warfarin dose day/week  Sunday Monday Tuesday Wednesday Thursday Friday Saturday   5 5 5 2.5 5 5 5     Sunday Monday Tuesday Wednesday Thursday Friday Saturday                Has the patient missed any doses of Coumadin, Warfarin, Jantoven in the past 7 days? No    Has the patients medications changed since the last visit? No    Has the patient experienced any bleeding recently? No    Has the patient experienced any injuries or illness recently? No    Has the patient experienced any 'new' shortness of breath, severe headaches, or changes in vision recently? No    Has the patient had any changes in their diet, or alcohol consumption? No    Is the patient here today to prepare for any type of upcoming surgery, procedure, or for a cardioversion procedure? No    What phone number can we reach the patient at today? home phone listed in demographics.

## 2021-06-24 NOTE — TELEPHONE ENCOUNTER
Orders being requested:  Nursing assessment to evaluate and treat  Reason service is needed/diagnosis:  Patient has been intermittently refusing medications, one of which is lasix and patient has bilateral edema with draining and blisters.    When are orders needed by: asap  Where to send Orders: Phone:  [phone]  Okay to leave detailed message?  Yes

## 2021-06-24 NOTE — TELEPHONE ENCOUNTER
RN cannot approve Refill Request    RN can NOT refill this medication historical medication requested. Last office visit: 1/2/2019 Danis Kenny MD Last Physical: Visit date not found Last MTM visit: Visit date not found Last visit same specialty: 1/2/2019 Danis Kenny MD.  Next visit within 3 mo: Visit date not found  Next physical within 3 mo: Visit date not found      Kaylan Monahan, [facility] Triage/Med Refill 3/7/2019    Requested Prescriptions   Pending Prescriptions Disp Refills     polyethylene glycol (GLYCOLAX) 17 gram/dose powder [Pharmacy Med Name: POLYETH GLYC POW 3350 NF] 510 g 10     Sig: MIX 17GM OF POWDER IN 8OZ OF WATER UNTIL COMPLETELY DISSOLVED. DRINK SOLUTION BY MOUTH DAILY    GI Medications Refill Protocol Passed - 3/4/2019  2:51 PM       Passed - PCP or prescribing provider visit in last 12 or next 3 months.    Last office visit with prescriber/PCP: 1/2/2019 Danis Kenny MD OR same dept: 1/2/2019 Danis Kenny MD OR same specialty: 1/2/2019 Danis Kenny MD  Last physical: Visit date not found Last MTM visit: Visit date not found   Next visit within 3 mo: Visit date not found  Next physical within 3 mo: Visit date not found  Prescriber OR PCP: Danis Kenny MD  Last diagnosis associated with med order: There are no diagnoses linked to this encounter.  If protocol passes may refill for 12 months if within 3 months of last provider visit (or a total of 15 months).

## 2021-06-24 NOTE — TELEPHONE ENCOUNTER
Orders being requested: need orders faxed to discontinue patient's lisinopril prescription. [facility] told [facility] that they received verbal order to discontinue patient's Lisinopril in January, but [facility] does not have order on file to actually discontinue for patient.  Reason service is needed/diagnosis: patient's Lisinopril discontinued and need orders to do so at [facility]  When are orders needed by: ASAP  Where to send Orders: Fax: [phone]  Okay to leave detailed message?  Yes

## 2021-06-24 NOTE — TELEPHONE ENCOUNTER
FYI - Status Update  Who is Calling: Home Care  Update: Due to staffing issue, patient start of care was delayed until tomorrow.  Okay to leave a detailed message?:  No return call needed

## 2021-06-24 NOTE — TELEPHONE ENCOUNTER
ANTICOAGULATION  MANAGEMENT- Home Care/Care Facility Result    Assessment     Today's INR result of 2.3 is Therapeutic (goal INR of 2.0-3.0)        Warfarin taken as previously instructed    No new diet changes affecting INR    No new medication/supplements affecting INR    Continues to tolerate warfarin with no reported s/s of bleeding or thromboembolism     Previous INR was Therapeutic     Called dose to [facility]    Plan:     Left a detailed message for nurse Moira discussed INR result and instructed:     Warfarin Dosing Instructions: Continue current warfarin dose 2.5 mg daily on wed; and 5 mg daily rest of week  (0 % change)    Next INR to be drawn: one week    ?   Angel Holly RN    Subjective/Objective:      Axel Gonzales, a 86 y.o. male is established on warfarin.     Home care/care facility RN's report of Axel INR, recent warfarin dosing, diet changes, medication changes, and symptoms is documented below.    Additional findings:     Anticoagulation Episode Summary     Current INR goal:   2.0-3.0   TTR:   59.6 % (3.2 y)   Next INR check:   3/5/2019   INR from last check:   2.33 (2/25/2019)   Weekly max warfarin dose:      Target end date:      INR check location:      Preferred lab:      Send INR reminders to:   ANTICOAGULATION POOL A (WBY,WBE,MID,RSC)    Indications    Long term (current) use of anticoagulants [Z79.01]  Pulmonary emboli (H) (Resolved) [I26.99]           Comments:            Anticoagulation Care Providers     Provider Role Specialty Phone number    Danis Kenny MD  Internal Medicine [phone]

## 2021-06-24 NOTE — TELEPHONE ENCOUNTER
ANTICOAGULATION  MANAGEMENT- Home Care/Care Facility Result    Assessment     Today's INR result of 1.9 is Subtherapeutic (goal INR of 2.0-3.0)        Warfarin taken as previously instructed booster dose last week    No new diet changes affecting INR    No new medication/supplements affecting INR    Continues to tolerate warfarin with no reported s/s of bleeding or thromboembolism     Previous INR was Subtherapeutic     New dose phoned to [facility]    Plan:     Spoke with [place] nurse Kelton discussed INR result and instructed:     Warfarin Dosing Instructions: Change warfarin dose to 2.5 mg daily on wed; and 5 mg daily rest of week  (9 % change)    Next INR to be drawn: one week      Kelton verbalizes understanding and agrees to warfarin dosing plan.   ?   Angel Holly RN    Subjective/Objective:      Axel Gonzales, a 86 y.o. male is established on warfarin.     Home care/care facility RN's report of Axel INR, recent warfarin dosing, diet changes, medication changes, and symptoms is documented below.    Additional findings: verbally confirmed home dose with Kelton and updated on anticoagulation calendar    Anticoagulation Episode Summary     Current INR goal:   2.0-3.0   TTR:   59.5 % (3.2 y)   Next INR check:   2/26/2019   INR from last check:   1.90! (2/18/2019)   Weekly max warfarin dose:      Target end date:      INR check location:      Preferred lab:      Send INR reminders to:   ANTICOAGULATION POOL A (WBY,WBE,MID,RSC)    Indications    Long term (current) use of anticoagulants [Z79.01]  Pulmonary emboli (H) (Resolved) [I26.99]           Comments:            Anticoagulation Care Providers     Provider Role Specialty Phone number    Danis Kenny MD  Internal Medicine [phone]

## 2021-06-24 NOTE — TELEPHONE ENCOUNTER
Keisha from [facility] called back and confirmed that she received the message left by ACN.    Emma Mercer RN

## 2021-06-24 NOTE — TELEPHONE ENCOUNTER
INR result is 1.90 2/18 evening  INR   Date Value Ref Range Status   02/18/2019 1.90 (H) 0.90 - 1.10 Final       Will the patient be seen, or did they already see, MD or CNP today? No    Most Recent Warfarin dose day/week  Sunday Monday Tuesday Wednesday Thursday Friday Saturday     7.5 mg 2.5 mg 5 mg 2.5 mg 5 mg     Sunday Monday Tuesday Wednesday Thursday Friday Saturday   5 mg 2.5 mg            Has the patient missed any doses of Coumadin, Warfarin, Jantoven in the past 7 days? No    Has the patients medications changed since the last visit? No    Has the patient experienced any bleeding recently? No    Has the patient experienced any injuries or illness recently? No    Has the patient experienced any 'new' shortness of breath, severe headaches, or changes in vision recently? No    Has the patient had any changes in their diet, or alcohol consumption? No    Is the patient here today to prepare for any type of upcoming surgery, procedure, or for a cardioversion procedure? No    What phone number can we reach the patient at today? [phone] da.

## 2021-06-24 NOTE — PATIENT INSTRUCTIONS - HE
Losartan/hctz 100/25 each morning for swelling and blood pressure    Stop Lisinopril    Add back adderall 15 mgs each morning which we did back in august for energy    Stop Miralax and senna for contipation    Change furosemide to evening with the warfarin    Add spironolactone 25 mgs each morning for swelling associated with nodules in the adrenal glands    Stop potassium because we are adding spironolactone    See back in one month

## 2021-06-24 NOTE — TELEPHONE ENCOUNTER
ANTICOAGULATION  MANAGEMENT- Home Care/Care Facility Result    Assessment     Today's INR result of 3.7 is Therapeutic (goal INR of 2.0-3.0)        Warfarin taken as previously instructed     No new diet changes affecting INR    No new medication/supplements affecting INR    Continues to tolerate warfarin with no reported s/s of bleeding or thromboembolism     Previous INR was Therapeutic     Orders phoned to pharmacy    Plan:     Spoke with nurse Moira discussed INR result and instructed:     Warfarin Dosing Instructions: hold today then change warfarin dose to 2.5 mg daily on mon/wed/fri; and 5 mg daily rest of week  (0 % change)    Next INR to be drawn: u 3/21    Moira verbalizes understanding and agrees to warfarin dosing plan.   ?   Angel Holly RN    Subjective/Objective:      Axel Gonzales, a 86 y.o. male is established on warfarin.     Home care/care facility RN's report of Axel INR, recent warfarin dosing, diet changes, medication changes, and symptoms is documented below.    Additional findings: none    Anticoagulation Episode Summary     Current INR goal:   2.0-3.0   TTR:   59.2 % (3.3 y)   Next INR check:   3/21/2019   INR from last check:   3.70! (3/13/2019)   Weekly max warfarin dose:      Target end date:      INR check location:      Preferred lab:      Send INR reminders to:   ANTICOAGULATION POOL A (WBY,WBE,MID,RSC)    Indications    Long term (current) use of anticoagulants [Z79.01]  Pulmonary emboli (H) (Resolved) [I26.99]           Comments:            Anticoagulation Care Providers     Provider Role Specialty Phone number    Danis Kenny MD  Internal Medicine [phone]

## 2021-06-24 NOTE — TELEPHONE ENCOUNTER
INR result is 1.46  INR   Date Value Ref Range Status   02/12/2019 1.46 (H) 0.90 - 1.10 Final       Will the patient be seen, or did they already see, MD or CNP today? No    Most Recent Warfarin dose day/week  Sunday Monday Tuesday Wednesday Thursday Friday Saturday     5 mg 2.5 mg 5 mg 2.5 mg 5 mg     Sunday Monday Tuesday Wednesday Thursday Friday Saturday   2.5 mg 2.5 mg            Has the patient missed any doses of Coumadin, Warfarin, Jantoven in the past 7 days? No    Has the patients medications changed since the last visit? No    Has the patient experienced any bleeding recently? No    Has the patient experienced any injuries or illness recently? No    Has the patient experienced any 'new' shortness of breath, severe headaches, or changes in vision recently? No    Has the patient had any changes in their diet, or alcohol consumption? No    Is the patient here today to prepare for any type of upcoming surgery, procedure, or for a cardioversion procedure? No    What phone number can we reach the patient at today? [phone] Moira.

## 2021-06-24 NOTE — TELEPHONE ENCOUNTER
INR result is 3/12 late result 3.06  INR   Date Value Ref Range Status   03/13/2019 3.70 (H) 0.90 - 1.10 Final       Will the patient be seen, or did they already see, MD or CNP today? Yes PCP    Most Recent Warfarin dose day/week  Sunday Monday Tuesday Wednesday Thursday Friday Saturday      2.5 mg 5 mg 5 mg 2.5 mg     Sunday Monday Tuesday Wednesday Thursday Friday Saturday   5 mg 5 mg 5 mg           Has the patient missed any doses of Coumadin, Warfarin, Jantoven in the past 7 days? No    Has the patients medications changed since the last visit? Yes 3/13   DC'ed   Potassium Lisinopril Senna Miralax    Started 3/14   Losartan/HCTZ 100/25 mg  Spironolactone 25 mg   Changing Furosemide to QHS  Adderall 15 mg 1/X daily in am    Has the patient experienced any bleeding recently? No    Has the patient experienced any injuries or illness recently? Yes Bilateral lower extremity edema    Has the patient experienced any 'new' shortness of breath, severe headaches, or changes in vision recently? No    Has the patient had any changes in their diet, or alcohol consumption? No    Is the patient here today to prepare for any type of upcoming surgery, procedure, or for a cardioversion procedure? No    What phone number can we reach the patient at today? [phone] [place].

## 2021-06-24 NOTE — TELEPHONE ENCOUNTER
ANTICOAGULATION  MANAGEMENT- Home Care/Care Facility Result    Assessment     Today's INR result of 1.46 is Subtherapeutic (goal INR of 2.0-3.0)        Warfarin taken as previously instructed    No new diet changes affecting INR    No new medication/supplements affecting INR    Continues to tolerate warfarin with no reported s/s of bleeding or thromboembolism     Previous INR was Subtherapeutic and dose was adjusted on 1/28/19.    Plan:     Spoke with Moira with [facility] discussed INR result and instructed:     Warfarin Dosing Instructions: have the patient take one time booster dose of 7.5 mg today then change warfarin dose to    2.5 mg on Mon, Wed, Fri; 5 mg all other days      (10 % change)    Next INR to be drawn: 5-7 days.  ACN  called  [facility] at [phone] and left a detailed message regarding dosing instructions.    Education provided: importance of therapeutic range and target INR goal and significance of current INR result    Moira verbalizes understanding and agrees to warfarin dosing plan.   ?   Emma Mercer RN    Subjective/Objective:      Axel Gonzales, a 86 y.o. male is established on warfarin.     Home care/care facility RN's report of Axel INR, recent warfarin dosing, diet changes, medication changes, and symptoms is documented below.    Additional findings: Moira confirmed no changes made that could affect INR today.    Anticoagulation Episode Summary     Current INR goal:   2.0-3.0   TTR:   59.9 % (3.2 y)   Next INR check:   2/19/2019   INR from last check:   1.46! (2/12/2019)   Weekly max warfarin dose:      Target end date:      INR check location:      Preferred lab:      Send INR reminders to:   ANTICOAGULATION POOL A (WBY,WBE,MID,RSC)    Indications    Long term (current) use of anticoagulants [Z79.01]  Pulmonary emboli (H) (Resolved) [I26.99]           Comments:            Anticoagulation Care Providers     Provider Role Specialty Phone number    Zoya  Danis VERGARA MD  Internal Medicine [phone]

## 2021-06-24 NOTE — TELEPHONE ENCOUNTER
Medication Question or Clarification  Who is calling: Other: NUrsing at [facility]   What medication are you calling about? (include dose and sig)  Adderal  Who prescribed the medication?:  Zoya  What is your question/concern?:  Prescription was sent to the wrong pharmacy needs to go to [facility]   Pharmacy:  [facility]  Writer called and cancelled at [facility]   Please send to new pharamcy  Okay to leave a detailed message?: No  Site CMT - Please call the pharmacy to obtain any additional needed information.

## 2021-06-24 NOTE — TELEPHONE ENCOUNTER
Based on Dr. Kenny's last note, on January 2 he did discontinue lisinopril 5 mg daily.  Please fax orders to discontinue this medication where it is needed, this may need his signature.

## 2021-06-25 NOTE — TELEPHONE ENCOUNTER
ANTICOAGULATION  MANAGEMENT- Home Care/Care Facility Result    Assessment     Today's INR result of 2.65 is Therapeutic (goal INR of 2.0-3.0)        Warfarin taken as previously instructed    No new diet changes affecting INR    No new medication/supplements affecting INR    Continues to tolerate warfarin with no reported s/s of bleeding or thromboembolism     Previous INR was Supratherapeutic    Plan:     Spoke with Yelena from [facility] discussed INR result and instructed:     Warfarin Dosing Instructions: Continue current warfarin dose    2.5 mg every Mon, Wed, Fri; 5 mg all other days       Next INR to be drawn: one week    Education provided: importance of therapeutic range    Yelena verbalizes understanding and agrees to warfarin dosing plan.   ?   Lilia Raman RN    Subjective/Objective:      Axel Gonzales, a 86 y.o. male is established on warfarin.     Home care/care facility RN's report of Axel INR, recent warfarin dosing, diet changes, medication changes, and symptoms is documented below.    Additional findings: none    Anticoagulation Episode Summary     Current INR goal:   2.0-3.0   TTR:   59.0 % (3.3 y)   Next INR check:   3/28/2019   INR from last check:   2.65 (3/21/2019)   Weekly max warfarin dose:      Target end date:      INR check location:      Preferred lab:      Send INR reminders to:   ANTICOAGULATION POOL A (WBY,WBE,MID,RSC)    Indications    Long term (current) use of anticoagulants [Z79.01]  Pulmonary emboli (H) (Resolved) [I26.99]           Comments:            Anticoagulation Care Providers     Provider Role Specialty Phone number    Danis Kenny MD  Internal Medicine [phone]

## 2021-06-25 NOTE — TELEPHONE ENCOUNTER
Left message for home health and gave verbal orders for the below requested per Danis Kenny MD. No further questions or concerns at this time.

## 2021-06-25 NOTE — TELEPHONE ENCOUNTER
INR result is  2.65  INR   Date Value Ref Range Status   03/21/2019 2.65 (H) 0.90 - 1.10 Final       Will the patient be seen, or did they already see, MD or CNP today? No    Most Recent Warfarin dose day/week  Sunday Monday Tuesday Wednesday Thursday Friday Saturday      held 5 2.5 5     Sunday Monday Tuesday Wednesday Thursday Friday Saturday   5 2.5 5 2.5          Has the patient missed any doses of Coumadin, Warfarin, Jantoven in the past 7 days? No    Has the patients medications changed since the last visit? No    Has the patient experienced any bleeding recently? No    Has the patient experienced any injuries or illness recently? Yes, some edema and lower extremity skin wounds which are being treated.    Has the patient experienced any 'new' shortness of breath, severe headaches, or changes in vision recently? No    Has the patient had any changes in their diet, or alcohol consumption? No    Is the patient here today to prepare for any type of upcoming surgery, procedure, or for a cardioversion procedure? No    What phone number can we reach the patient at today? [facility] [phone]

## 2021-06-25 NOTE — TELEPHONE ENCOUNTER
Orders being requested: Home Care  Occupational Therapy    1 time a week for 1 week    3 times a week for 3 weeks  Reason service is needed/diagnosis: lymphedema  treatment  When are orders needed by: ASAFAVIOLA  Where to send Orders: Phone:  Verbal order  Okay to leave detailed message?  Yes

## 2021-07-14 PROBLEM — N17.9 AKI (ACUTE KIDNEY INJURY) (H): Status: RESOLVED | Noted: 2018-01-01 | Resolved: 2019-01-01

## 2022-11-22 NOTE — TELEPHONE ENCOUNTER
PT currently admitted to [facility].    Total Number Of Aks Treated: 16 Render Post-Care Instructions In Note?: no Post-Care Instructions: I reviewed with the patient in detail post-care instructions. Patient is to wear sunprotection, and avoid picking at any of the treated lesions. Pt may apply Vaseline to crusted or scabbing areas. Duration Of Freeze Thaw-Cycle (Seconds): 0 Detail Level: Zone Consent: The patient's consent was obtained including but not limited to risks of crusting, scabbing, blistering, scarring, darker or lighter pigmentary change, recurrence, incomplete removal and infection.